# Patient Record
Sex: FEMALE | Race: WHITE | NOT HISPANIC OR LATINO | URBAN - METROPOLITAN AREA
[De-identification: names, ages, dates, MRNs, and addresses within clinical notes are randomized per-mention and may not be internally consistent; named-entity substitution may affect disease eponyms.]

---

## 2017-03-28 ENCOUNTER — OUTPATIENT (OUTPATIENT)
Dept: OUTPATIENT SERVICES | Facility: HOSPITAL | Age: 52
LOS: 1 days | Discharge: HOME | End: 2017-03-28

## 2017-04-24 ENCOUNTER — OUTPATIENT (OUTPATIENT)
Dept: OUTPATIENT SERVICES | Facility: HOSPITAL | Age: 52
LOS: 1 days | Discharge: HOME | End: 2017-04-24

## 2017-06-27 DIAGNOSIS — G35 MULTIPLE SCLEROSIS: ICD-10-CM

## 2017-07-06 ENCOUNTER — OUTPATIENT (OUTPATIENT)
Dept: OUTPATIENT SERVICES | Facility: HOSPITAL | Age: 52
LOS: 1 days | Discharge: HOME | End: 2017-07-06

## 2017-07-06 DIAGNOSIS — G35 MULTIPLE SCLEROSIS: ICD-10-CM

## 2017-07-06 DIAGNOSIS — R53.81 OTHER MALAISE: ICD-10-CM

## 2017-07-06 DIAGNOSIS — E55.9 VITAMIN D DEFICIENCY, UNSPECIFIED: ICD-10-CM

## 2017-07-06 DIAGNOSIS — Z12.31 ENCOUNTER FOR SCREENING MAMMOGRAM FOR MALIGNANT NEOPLASM OF BREAST: ICD-10-CM

## 2017-07-10 DIAGNOSIS — M81.0 AGE-RELATED OSTEOPOROSIS WITHOUT CURRENT PATHOLOGICAL FRACTURE: ICD-10-CM

## 2017-07-10 DIAGNOSIS — Z09 ENCOUNTER FOR FOLLOW-UP EXAMINATION AFTER COMPLETED TREATMENT FOR CONDITIONS OTHER THAN MALIGNANT NEOPLASM: ICD-10-CM

## 2017-07-10 DIAGNOSIS — Z82.62 FAMILY HISTORY OF OSTEOPOROSIS: ICD-10-CM

## 2017-07-10 DIAGNOSIS — N95.1 MENOPAUSAL AND FEMALE CLIMACTERIC STATES: ICD-10-CM

## 2017-12-07 ENCOUNTER — APPOINTMENT (OUTPATIENT)
Dept: PLASTIC SURGERY | Facility: CLINIC | Age: 52
End: 2017-12-07
Payer: COMMERCIAL

## 2017-12-07 PROCEDURE — 11950 SUBQ NJX FILLING MATRL 1CC/<: CPT

## 2017-12-28 ENCOUNTER — APPOINTMENT (OUTPATIENT)
Dept: SURGERY | Facility: CLINIC | Age: 52
End: 2017-12-28
Payer: MEDICAID

## 2017-12-28 DIAGNOSIS — Z87.891 PERSONAL HISTORY OF NICOTINE DEPENDENCE: ICD-10-CM

## 2017-12-28 DIAGNOSIS — Z87.09 PERSONAL HISTORY OF OTHER DISEASES OF THE RESPIRATORY SYSTEM: ICD-10-CM

## 2017-12-28 DIAGNOSIS — F41.9 ANXIETY DISORDER, UNSPECIFIED: ICD-10-CM

## 2017-12-28 DIAGNOSIS — Z87.39 PERSONAL HISTORY OF OTHER DISEASES OF THE MUSCULOSKELETAL SYSTEM AND CONNECTIVE TISSUE: ICD-10-CM

## 2017-12-28 DIAGNOSIS — N63.20 UNSPECIFIED LUMP IN THE LEFT BREAST, UNSPECIFIED QUADRANT: ICD-10-CM

## 2017-12-28 DIAGNOSIS — Z78.9 OTHER SPECIFIED HEALTH STATUS: ICD-10-CM

## 2017-12-28 PROCEDURE — 99202 OFFICE O/P NEW SF 15 MIN: CPT

## 2017-12-28 RX ORDER — ARMODAFINIL 150 MG/1
150 TABLET ORAL
Refills: 0 | Status: ACTIVE | COMMUNITY

## 2017-12-28 RX ORDER — SIMVASTATIN 20 MG/1
20 TABLET, FILM COATED ORAL
Refills: 0 | Status: ACTIVE | COMMUNITY

## 2017-12-28 RX ORDER — GLATIRAMER ACETATE 40 MG/ML
40 INJECTION, SOLUTION SUBCUTANEOUS
Refills: 0 | Status: ACTIVE | COMMUNITY

## 2017-12-28 RX ORDER — FLUOXETINE HYDROCHLORIDE 20 MG/1
20 CAPSULE ORAL
Refills: 0 | Status: ACTIVE | COMMUNITY

## 2018-01-25 ENCOUNTER — OUTPATIENT (OUTPATIENT)
Dept: OUTPATIENT SERVICES | Facility: HOSPITAL | Age: 53
LOS: 1 days | Discharge: HOME | End: 2018-01-25

## 2018-01-25 DIAGNOSIS — G35 MULTIPLE SCLEROSIS: ICD-10-CM

## 2018-02-15 ENCOUNTER — INPATIENT (INPATIENT)
Facility: HOSPITAL | Age: 53
LOS: 1 days | Discharge: HOME | End: 2018-02-17
Attending: INTERNAL MEDICINE | Admitting: INTERNAL MEDICINE

## 2018-02-15 VITALS
HEART RATE: 80 BPM | RESPIRATION RATE: 18 BRPM | DIASTOLIC BLOOD PRESSURE: 51 MMHG | TEMPERATURE: 97 F | SYSTOLIC BLOOD PRESSURE: 104 MMHG | OXYGEN SATURATION: 100 %

## 2018-02-15 DIAGNOSIS — F32.9 MAJOR DEPRESSIVE DISORDER, SINGLE EPISODE, UNSPECIFIED: ICD-10-CM

## 2018-02-15 DIAGNOSIS — G35 MULTIPLE SCLEROSIS: ICD-10-CM

## 2018-02-15 LAB
ALBUMIN SERPL ELPH-MCNC: 4.1 G/DL — SIGNIFICANT CHANGE UP (ref 3–5.5)
ALP SERPL-CCNC: 42 U/L — SIGNIFICANT CHANGE UP (ref 30–115)
ALT FLD-CCNC: 22 U/L — SIGNIFICANT CHANGE UP (ref 0–41)
ANION GAP SERPL CALC-SCNC: 10 MMOL/L — SIGNIFICANT CHANGE UP (ref 7–14)
APTT BLD: 27.9 SEC — SIGNIFICANT CHANGE UP (ref 27–39.2)
AST SERPL-CCNC: 24 U/L — SIGNIFICANT CHANGE UP (ref 0–41)
BASOPHILS # BLD AUTO: 0.04 K/UL — SIGNIFICANT CHANGE UP (ref 0–0.2)
BASOPHILS NFR BLD AUTO: 0.6 % — SIGNIFICANT CHANGE UP (ref 0–1)
BILIRUB SERPL-MCNC: 0.4 MG/DL — SIGNIFICANT CHANGE UP (ref 0.2–1.2)
BUN SERPL-MCNC: 11 MG/DL — SIGNIFICANT CHANGE UP (ref 10–20)
CALCIUM SERPL-MCNC: 9.3 MG/DL — SIGNIFICANT CHANGE UP (ref 8.5–10.1)
CHLORIDE SERPL-SCNC: 104 MMOL/L — SIGNIFICANT CHANGE UP (ref 98–110)
CO2 SERPL-SCNC: 26 MMOL/L — SIGNIFICANT CHANGE UP (ref 17–32)
CREAT SERPL-MCNC: 0.7 MG/DL — SIGNIFICANT CHANGE UP (ref 0.7–1.5)
EOSINOPHIL # BLD AUTO: 0.07 K/UL — SIGNIFICANT CHANGE UP (ref 0–0.7)
EOSINOPHIL NFR BLD AUTO: 1 % — SIGNIFICANT CHANGE UP (ref 0–8)
GLUCOSE SERPL-MCNC: 90 MG/DL — SIGNIFICANT CHANGE UP (ref 70–110)
HCT VFR BLD CALC: 37.9 % — SIGNIFICANT CHANGE UP (ref 37–47)
HGB BLD-MCNC: 13.1 G/DL — LOW (ref 14–18)
IMM GRANULOCYTES NFR BLD AUTO: 0.3 % — SIGNIFICANT CHANGE UP (ref 0.1–0.3)
INR BLD: 1.05 RATIO — SIGNIFICANT CHANGE UP (ref 0.65–1.3)
LYMPHOCYTES # BLD AUTO: 2.18 K/UL — SIGNIFICANT CHANGE UP (ref 1.2–3.4)
LYMPHOCYTES # BLD AUTO: 31.8 % — SIGNIFICANT CHANGE UP (ref 20.5–51.1)
MCHC RBC-ENTMCNC: 31 PG — SIGNIFICANT CHANGE UP (ref 27–31)
MCHC RBC-ENTMCNC: 34.6 G/DL — SIGNIFICANT CHANGE UP (ref 32–37)
MCV RBC AUTO: 89.8 FL — SIGNIFICANT CHANGE UP (ref 81–91)
MONOCYTES # BLD AUTO: 0.31 K/UL — SIGNIFICANT CHANGE UP (ref 0.1–0.6)
MONOCYTES NFR BLD AUTO: 4.5 % — SIGNIFICANT CHANGE UP (ref 1.7–9.3)
NEUTROPHILS # BLD AUTO: 4.23 K/UL — SIGNIFICANT CHANGE UP (ref 1.4–6.5)
NEUTROPHILS NFR BLD AUTO: 61.8 % — SIGNIFICANT CHANGE UP (ref 42.2–75.2)
NRBC # BLD: 0 /100 WBCS — SIGNIFICANT CHANGE UP (ref 0–0)
PLATELET # BLD AUTO: 239 K/UL — SIGNIFICANT CHANGE UP (ref 130–400)
POTASSIUM SERPL-MCNC: 4.4 MMOL/L — SIGNIFICANT CHANGE UP (ref 3.5–5)
POTASSIUM SERPL-SCNC: 4.4 MMOL/L — SIGNIFICANT CHANGE UP (ref 3.5–5)
PROT SERPL-MCNC: 6.6 G/DL — SIGNIFICANT CHANGE UP (ref 6–8)
PROTHROM AB SERPL-ACNC: 11.4 SEC — SIGNIFICANT CHANGE UP (ref 9.95–12.87)
RBC # BLD: 4.22 M/UL — SIGNIFICANT CHANGE UP (ref 4.2–5.4)
RBC # FLD: 13.1 % — SIGNIFICANT CHANGE UP (ref 11.5–14.5)
SODIUM SERPL-SCNC: 140 MMOL/L — SIGNIFICANT CHANGE UP (ref 135–146)
WBC # BLD: 6.85 K/UL — SIGNIFICANT CHANGE UP (ref 4.8–10.8)
WBC # FLD AUTO: 6.85 K/UL — SIGNIFICANT CHANGE UP (ref 4.8–10.8)

## 2018-02-15 RX ORDER — GLATIRAMER ACETATE 20 MG/ML
0 INJECTION, SOLUTION SUBCUTANEOUS
Qty: 0 | Refills: 0 | COMMUNITY

## 2018-02-15 RX ORDER — PANTOPRAZOLE SODIUM 20 MG/1
40 TABLET, DELAYED RELEASE ORAL
Qty: 0 | Refills: 0 | Status: DISCONTINUED | OUTPATIENT
Start: 2018-02-15 | End: 2018-02-17

## 2018-02-15 RX ORDER — FLUOXETINE HCL 10 MG
20 CAPSULE ORAL DAILY
Qty: 0 | Refills: 0 | Status: DISCONTINUED | OUTPATIENT
Start: 2018-02-15 | End: 2018-02-17

## 2018-02-15 RX ORDER — GLATIRAMER ACETATE 20 MG/ML
40 INJECTION, SOLUTION SUBCUTANEOUS EVERY OTHER DAY
Qty: 0 | Refills: 0 | Status: DISCONTINUED | OUTPATIENT
Start: 2018-02-15 | End: 2018-02-17

## 2018-02-15 RX ORDER — ENOXAPARIN SODIUM 100 MG/ML
40 INJECTION SUBCUTANEOUS EVERY 24 HOURS
Qty: 0 | Refills: 0 | Status: DISCONTINUED | OUTPATIENT
Start: 2018-02-15 | End: 2018-02-17

## 2018-02-15 RX ORDER — FLUOXETINE HCL 10 MG
0 CAPSULE ORAL
Qty: 0 | Refills: 0 | COMMUNITY

## 2018-02-15 RX ADMIN — Medication 58 MILLIGRAM(S): at 17:12

## 2018-02-15 NOTE — H&P ADULT - HISTORY OF PRESENT ILLNESS
53 y f with pmh of depression, ms which her last exacerbation in 2017 and she received iv steroids and ivig at home in may. pt states she started noticing weakness and pain in b/l foot worse on right since past 2 weeks which has been worsening and affected her gait. she states since yesterday her gait has been wobbly and also mentions she noticed her vision has became more blurry in both eyes and denies any double vision or peripheral vision loss. she also noticed numbness in her rt hand for past week. she denies any fever,n/v , chills, cp ,sob, cough. she was sent to by  today.  Tried to reach  for further workup or plan but left message

## 2018-02-15 NOTE — H&P ADULT - ASSESSMENT
53 y f with pmh of depression and ms presented with weakness adn pain in b/l foot worse in right, numbness in rt hand likely 2/2 ms exacerbation. 53 y f with pmh of depression and ms presented with weakness and pain in b/l foot worse in right, numbness in rt hand likely 2/2 ms exacerbation.

## 2018-02-15 NOTE — ED PROVIDER NOTE - NS ED ROS FT
Review of Systems:  	•	CONSTITUTIONAL - no fever, no diaphoresis, no weight change  	•	SKIN - no rash  	•	HEMATOLOGIC - no bleeding, no bruising  	•	EYES - no eye pain, chronic blurry vision  	•	ENT - no change in hearing, no pain  	•	RESPIRATORY - no shortness of breath, no cough  	•	CARDIAC - no chest pain, no palpitations  	•	GI - no abd pain, no nausea, no vomiting, no diarrhea, no constipation, no bleeding  	•	ENDO - no polydypsia, no polyurea, no heat/no cold intolerance  	•	MUSCULOSKELETAL - b/l leg weakness  	•	NEUROLOGIC -  b/l leg weakness, no headache, b/l foot and right hand paresthesias

## 2018-02-15 NOTE — ED PROVIDER NOTE - PHYSICAL EXAMINATION
VITAL SIGNS: I have reviewed nursing notes and confirm.  CONSTITUTIONAL: Well-developed; well-nourished; in no acute distress.  SKIN: Skin exam is warm and dry, no acute rash.  HEAD: Normocephalic; atraumatic.  EYES: PERRL, EOM intact; conjunctiva and sclera clear.  ENT: No nasal discharge; airway clear. TMs clear.  NECK: Supple; non tender.  CARD: S1, S2 normal; no murmurs, gallops, or rubs. Regular rate and rhythm.  RESP: No wheezes, rales or rhonchi.  ABD: Normal bowel sounds; soft; non-distended; non-tender; no hepatosplenomegaly.  EXT: Normal ROM. No clubbing, cyanosis or edema.  NEURO: Alert, oriented. Grossly unremarkable. No focal deficits. decreased sensation b/l feet and right hand  PSYCH: Cooperative, appropriate.

## 2018-02-15 NOTE — H&P ADULT - NSHPLABSRESULTS_GEN_ALL_CORE
T(C): 36.1 (02-15-18 @ 12:47), Max: 36.1 (02-15-18 @ 12:47)  HR: 80 (02-15-18 @ 12:47) (80 - 80)  BP: 104/51 (02-15-18 @ 12:47) (104/51 - 104/51)  RR: 18 (02-15-18 @ 12:47) (18 - 18)  SpO2: 100% (02-15-18 @ 12:47) (100% - 100%)                            13.1   6.85  )-----------( 239      ( 15 Feb 2018 15:40 )             37.9       02-15    140  |  104  |  11  ----------------------------<  90  4.4   |  26  |  0.7    Ca    9.3      15 Feb 2018 15:40    TPro  6.6  /  Alb  4.1  /  TBili  0.4  /  DBili  x   /  AST  24  /  ALT  22  /  AlkPhos  42  02-15                  PT/INR - ( 15 Feb 2018 15:40 )   PT: 11.40 sec;   INR: 1.05 ratio         PTT - ( 15 Feb 2018 15:40 )  PTT:27.9 sec

## 2018-02-15 NOTE — ED PROVIDER NOTE - OBJECTIVE STATEMENT
51 yo f with pmh of anxiety and MS, sent by dr. brown, presents with c/o increasing b/l leg weakness, b/l foot pain and numbness, and right hand numbness.  pt says her legs have been feeling "wobbly" and has collapsed in the past.  no head trauma.  no headache.  pt says has been worsening for about a week.  no f/c, n/v/d, abd pain, cp, sob.  pt admits has chronic b/l blurry vision, but worsened also.  reports that she has a severe flare about once a year requiring ivig and iv solumedrol, but had been able to stay home and have vns with infusion in the past.

## 2018-02-15 NOTE — H&P ADULT - PROBLEM SELECTOR PLAN 1
c/w solumedrol iv for 5 days  consider mri brain and spinal cord after discussion with neurology, pt request sedation before mri  consider ivig as pt benefited from ivig during last exacerbation after discussion with  neuro  start ppi  neurology c/s c/w solumedrol iv for 2 days  start ppi  neurology c/s

## 2018-02-15 NOTE — H&P ADULT - NSHPPHYSICALEXAM_GEN_ALL_CORE
PHYSICAL EXAM:      Constitutional: well developed and well nourished    Respiratory: lungs B/L clear on auscultation    Cardiovascular: S1S2 normal, no murmur heard    Gastrointestinal: Abd soft, non distended, non tender, BS+    Extremities: No pedal edema    Neurological: AAOX3, sensory- dec sensation to touch in rt foot, motor 5/5, cn 2-12 intact, rhomberg positive

## 2018-02-15 NOTE — H&P ADULT - ATTENDING COMMENTS
53 y f with pmh of depression, ms which her last exacerbation in 2017 and she received iv steroids and ivig at home in may. pt states she started noticing weakness and pain in b/l foot worse on right since past 2 weeks which has been worsening and affected her gait. she states 1-2d her gait has been wobbly and also mentions she noticed her vision has became more blurry in both eyes and denies any double vision or peripheral vision loss. she also noticed numbness in her rt hand for past week    #MS exacerbation  -IV Solumedrol  -PPI  -rehab  -check U/A, UCx    #Depression  -home meds    #GI, DVT prophylaxis

## 2018-02-15 NOTE — ED PROVIDER NOTE - PROGRESS NOTE DETAILS
endorsed to mar.  still no call back from neuro.  advised medicine team to f/u with neuro consult re: ivig

## 2018-02-16 ENCOUNTER — TRANSCRIPTION ENCOUNTER (OUTPATIENT)
Age: 53
End: 2018-02-16

## 2018-02-16 LAB
ANION GAP SERPL CALC-SCNC: 9 MMOL/L — SIGNIFICANT CHANGE UP (ref 7–14)
APPEARANCE UR: CLEAR — SIGNIFICANT CHANGE UP
BACTERIA # UR AUTO: (no result) /HPF
BILIRUB UR-MCNC: NEGATIVE — SIGNIFICANT CHANGE UP
BUN SERPL-MCNC: 11 MG/DL — SIGNIFICANT CHANGE UP (ref 10–20)
CALCIUM SERPL-MCNC: 9.1 MG/DL — SIGNIFICANT CHANGE UP (ref 8.5–10.1)
CHLORIDE SERPL-SCNC: 104 MMOL/L — SIGNIFICANT CHANGE UP (ref 98–110)
CO2 SERPL-SCNC: 25 MMOL/L — SIGNIFICANT CHANGE UP (ref 17–32)
COLOR SPEC: YELLOW — SIGNIFICANT CHANGE UP
CREAT SERPL-MCNC: 0.6 MG/DL — LOW (ref 0.7–1.5)
DIFF PNL FLD: NEGATIVE — SIGNIFICANT CHANGE UP
EPI CELLS # UR: (no result) /HPF
GLUCOSE SERPL-MCNC: 258 MG/DL — HIGH (ref 70–110)
GLUCOSE UR QL: NEGATIVE MG/DL — SIGNIFICANT CHANGE UP
HCT VFR BLD CALC: 37.4 % — SIGNIFICANT CHANGE UP (ref 37–47)
HGB BLD-MCNC: 13.2 G/DL — LOW (ref 14–18)
KETONES UR-MCNC: NEGATIVE — SIGNIFICANT CHANGE UP
LEUKOCYTE ESTERASE UR-ACNC: (no result)
MCHC RBC-ENTMCNC: 31.1 PG — HIGH (ref 27–31)
MCHC RBC-ENTMCNC: 35.3 G/DL — SIGNIFICANT CHANGE UP (ref 32–37)
MCV RBC AUTO: 88.2 FL — SIGNIFICANT CHANGE UP (ref 81–91)
NITRITE UR-MCNC: NEGATIVE — SIGNIFICANT CHANGE UP
NRBC # BLD: 0 /100 WBCS — SIGNIFICANT CHANGE UP (ref 0–0)
PH UR: 6.5 — SIGNIFICANT CHANGE UP (ref 5–8)
PLATELET # BLD AUTO: 236 K/UL — SIGNIFICANT CHANGE UP (ref 130–400)
POTASSIUM SERPL-MCNC: 3.8 MMOL/L — SIGNIFICANT CHANGE UP (ref 3.5–5)
POTASSIUM SERPL-SCNC: 3.8 MMOL/L — SIGNIFICANT CHANGE UP (ref 3.5–5)
PROT UR-MCNC: NEGATIVE MG/DL — SIGNIFICANT CHANGE UP
RBC # BLD: 4.24 M/UL — SIGNIFICANT CHANGE UP (ref 4.2–5.4)
RBC # FLD: 12.8 % — SIGNIFICANT CHANGE UP (ref 11.5–14.5)
SODIUM SERPL-SCNC: 138 MMOL/L — SIGNIFICANT CHANGE UP (ref 135–146)
SP GR SPEC: <=1.005 — SIGNIFICANT CHANGE UP (ref 1.01–1.03)
UROBILINOGEN FLD QL: 0.2 MG/DL — SIGNIFICANT CHANGE UP (ref 0.2–0.2)
WBC # BLD: 7.79 K/UL — SIGNIFICANT CHANGE UP (ref 4.8–10.8)
WBC # FLD AUTO: 7.79 K/UL — SIGNIFICANT CHANGE UP (ref 4.8–10.8)
WBC UR QL: (no result) /HPF

## 2018-02-16 RX ORDER — DIAZEPAM 5 MG
5 TABLET ORAL ONCE
Qty: 0 | Refills: 0 | Status: DISCONTINUED | OUTPATIENT
Start: 2018-02-16 | End: 2018-02-16

## 2018-02-16 RX ORDER — ACETAMINOPHEN 500 MG
650 TABLET ORAL ONCE
Qty: 0 | Refills: 0 | Status: COMPLETED | OUTPATIENT
Start: 2018-02-16 | End: 2018-02-16

## 2018-02-16 RX ORDER — DIAZEPAM 5 MG
5 TABLET ORAL THREE TIMES A DAY
Qty: 0 | Refills: 0 | Status: DISCONTINUED | OUTPATIENT
Start: 2018-02-16 | End: 2018-02-17

## 2018-02-16 RX ADMIN — GLATIRAMER ACETATE 40 MILLIGRAM(S): 20 INJECTION, SOLUTION SUBCUTANEOUS at 11:14

## 2018-02-16 RX ADMIN — Medication 650 MILLIGRAM(S): at 19:48

## 2018-02-16 RX ADMIN — Medication 5 MILLIGRAM(S): at 19:48

## 2018-02-16 RX ADMIN — Medication 19.33 MILLIGRAM(S): at 05:46

## 2018-02-16 RX ADMIN — ENOXAPARIN SODIUM 40 MILLIGRAM(S): 100 INJECTION SUBCUTANEOUS at 00:37

## 2018-02-16 RX ADMIN — Medication 20 MILLIGRAM(S): at 11:12

## 2018-02-16 RX ADMIN — PANTOPRAZOLE SODIUM 40 MILLIGRAM(S): 20 TABLET, DELAYED RELEASE ORAL at 05:47

## 2018-02-16 RX ADMIN — ENOXAPARIN SODIUM 40 MILLIGRAM(S): 100 INJECTION SUBCUTANEOUS at 23:52

## 2018-02-16 RX ADMIN — Medication 5 MILLIGRAM(S): at 15:55

## 2018-02-16 NOTE — PROGRESS NOTE ADULT - SUBJECTIVE AND OBJECTIVE BOX
Patient is a 52y old  Female who presents with a chief complaint of weakness (15 Feb 2018 20:48)      PAST MEDICAL & SURGICAL HISTORY:  MS (multiple sclerosis)  Depression, unspecified depression type  No significant past surgical history      MEDICATIONS  (STANDING):  enoxaparin Injectable 40 milliGRAM(s) SubCutaneous every 24 hours  FLUoxetine 20 milliGRAM(s) Oral daily  glatiramer Injectable 40 milliGRAM(s) SubCutaneous every other day  methylPREDNISolone sodium succinate IVPB 1000 milliGRAM(s) IV Intermittent daily  pantoprazole    Tablet 40 milliGRAM(s) Oral before breakfast    MEDICATIONS  (PRN):  diazepam    Tablet 5 milliGRAM(s) Oral three times a day PRN anxiety      Overnight events: no acute events    Vital Signs Last 24 Hrs  T(C): 35.8 (16 Feb 2018 15:57), Max: 36.5 (15 Feb 2018 21:00)  T(F): 96.4 (16 Feb 2018 15:57), Max: 97.7 (15 Feb 2018 21:00)  HR: 85 (16 Feb 2018 15:57) (62 - 85)  BP: 117/59 (16 Feb 2018 15:57) (100/64 - 117/59)  BP(mean): --  RR: 20 (16 Feb 2018 15:57) (16 - 20)  SpO2: 99% (16 Feb 2018 15:57) (99% - 99%)  CAPILLARY BLOOD GLUCOSE        I&O's Summary      Physical Exam:    -     General : lying comfortably on bed    -      HEENT: PERLLA    -      Cardiac: RRR    -      Pulm: B/l clear    -      GI: soft non tender    -      Musculoskeletal: no edema    -      Neuro: AO x 3, power 4+ on Rt foot, rest power 5/5        Labs:                        13.2   7.79  )-----------( 236      ( 16 Feb 2018 09:59 )             37.4             02-16    138  |  104  |  11  ----------------------------<  258<H>  3.8   |  25  |  0.6<L>    Ca    9.1      16 Feb 2018 09:59    TPro  6.6  /  Alb  4.1  /  TBili  0.4  /  DBili  x   /  AST  24  /  ALT  22  /  AlkPhos  42  02-15    LIVER FUNCTIONS - ( 15 Feb 2018 15:40 )  Alb: 4.1 g/dL / Pro: 6.6 g/dL / ALK PHOS: 42 U/L / ALT: 22 U/L / AST: 24 U/L / GGT: x                 PT/INR - ( 15 Feb 2018 15:40 )   PT: 11.40 sec;   INR: 1.05 ratio         PTT - ( 15 Feb 2018 15:40 )  PTT:27.9 sec              Imaging:    ECG: Patient is a 52y old  Female who presents with a chief complaint of weakness (15 Feb 2018 20:48)      PAST MEDICAL & SURGICAL HISTORY:  MS (multiple sclerosis)  Depression, unspecified depression type  No significant past surgical history      MEDICATIONS  (STANDING):  enoxaparin Injectable 40 milliGRAM(s) SubCutaneous every 24 hours  FLUoxetine 20 milliGRAM(s) Oral daily  glatiramer Injectable 40 milliGRAM(s) SubCutaneous every other day  methylPREDNISolone sodium succinate IVPB 1000 milliGRAM(s) IV Intermittent daily  pantoprazole    Tablet 40 milliGRAM(s) Oral before breakfast    MEDICATIONS  (PRN):  diazepam    Tablet 5 milliGRAM(s) Oral three times a day PRN anxiety      Overnight events: no acute events, feels better, no weakness, eyes Sx, dysuria, paresthesias    ICU Vital Signs Last 24 Hrs  T(C): 36.4 (17 Feb 2018 07:40), Max: 36.4 (17 Feb 2018 07:40)  T(F): 97.5 (17 Feb 2018 07:40), Max: 97.5 (17 Feb 2018 07:40)  HR: 58 (17 Feb 2018 07:40) (58 - 85)  BP: 105/51 (17 Feb 2018 07:40) (105/51 - 117/59)  BP(mean): --  ABP: --  ABP(mean): --  RR: 19 (17 Feb 2018 07:40) (19 - 20)  SpO2: 99% (16 Feb 2018 15:57) (99% - 99%)          I&O's Summary      Physical Exam:    -     General : lying comfortably on bed    -      HEENT: PERLLA    -      Cardiac: RRR    -      Pulm: B/l clear    -      GI: soft non tender    -      Musculoskeletal: no edema    -      Neuro: AO x 3, power 5/5        Labs:                        13.2   7.79  )-----------( 236      ( 16 Feb 2018 09:59 )             37.4             02-16    138  |  104  |  11  ----------------------------<  258<H>  3.8   |  25  |  0.6<L>    Ca    9.1      16 Feb 2018 09:59    TPro  6.6  /  Alb  4.1  /  TBili  0.4  /  DBili  x   /  AST  24  /  ALT  22  /  AlkPhos  42  02-15    LIVER FUNCTIONS - ( 15 Feb 2018 15:40 )  Alb: 4.1 g/dL / Pro: 6.6 g/dL / ALK PHOS: 42 U/L / ALT: 22 U/L / AST: 24 U/L / GGT: x                 PT/INR - ( 15 Feb 2018 15:40 )   PT: 11.40 sec;   INR: 1.05 ratio         PTT - ( 15 Feb 2018 15:40 )  PTT:27.9 sec              Imaging:    ECG:

## 2018-02-16 NOTE — DISCHARGE NOTE ADULT - MEDICATION SUMMARY - MEDICATIONS TO TAKE
I will START or STAY ON the medications listed below when I get home from the hospital:    FLUoxetine 20 mg oral capsule  -- 1 cap(s) by mouth once a day  -- Indication: For Depression, unspecified depression type    Copaxone 40 mg/mL subcutaneous solution  -- 40 milligram(s) subcutaneous 3 times a week  -- Indication: For MS (multiple sclerosis)

## 2018-02-16 NOTE — CONSULT NOTE ADULT - SUBJECTIVE AND OBJECTIVE BOX
HPI:  53 y f with pmh of depression, ms which her last exacerbation in 2017 and she received iv steroids and ivig at home in may. pt states she started noticing weakness and pain in b/l foot worse on right since past 2 weeks which has been worsening and affected her gait. she states since yesterday her gait has been wobbly and also mentions she noticed her vision has became more blurry in both eyes and denies any double vision or peripheral vision loss. she also noticed numbness in her rt hand for past week. she denies any fever,n/v , chills, cp ,sob, cough. she was sent to by  today.  Tried to reach  for further workup or plan but left message (15 Feb 2018 20:48)  for three days iv steroids the dc to home    T(C): 35.7 (02-16-18 @ 07:45), Max: 36.5 (02-15-18 @ 21:00)  HR: 62 (02-16-18 @ 07:45) (62 - 80)  BP: 112/53 (02-16-18 @ 07:45) (100/64 - 117/58)  RR: 16 (02-16-18 @ 07:45) (16 - 18)  SpO2: 100% (02-15-18 @ 12:47) (100% - 100%)    MOTOR EXAM 5/5 all ext except lle 4+5 distally   TRANSFERS AND AMBULATES INDEPENDENTLY WITHOUT DEVICE    Physical Medicine And Rehabilitation Services are not indicated in this patient for the following Reason(s):    [    ] Patient is medically unstable      [    ]  Patient does not have appropriate activity orders      [     ] Patient has no weight bearing status for:      [x     ] Patient is independently ambulating      [     ]  Patient is from Skilled Nursing Facility and is appropriate to return      [     ] Patient was non-ambulatory prior to admission      [     ]  Other  NO NEED FOR ACUTE CARE PT/REHAB  CAN DC HOME WITH FAMILY AND RESUME OPD PT AS PTA

## 2018-02-16 NOTE — CONSULT NOTE ADULT - ASSESSMENT
A 53 yo woman with h/o RRMS, depression adm for MS exacerbation. Now improving on IV Solumedrol.  - RRMS exacerbation  - h/o depression      Plan:  - cont Solumedrol 1gm IVPB q24hrs for 3 days total  - Cont Prozac at current dose  - cont Copaxone 40mg SC 3x a week  - GI, DVT proph  - PT/Rehab evaluation  - pt is neurologically stable for d/c after the 3rd dose of IV Solumedrol if OK with rehab  - will follow

## 2018-02-16 NOTE — DISCHARGE NOTE ADULT - PLAN OF CARE
Prevent recurrence Take medication as prescribed, follow up with your neurologist Take medication as prescribed, follow up with your neurologist, PMD and outpt PT

## 2018-02-16 NOTE — DISCHARGE NOTE ADULT - PATIENT PORTAL LINK FT
You can access the [facility], offered by [facility], by registering with the following website: [email or web address]

## 2018-02-16 NOTE — DISCHARGE NOTE ADULT - CARE PROVIDER_API CALL
Kush Mccollum), Internal Medicine  [address] [place] [id number]  Phone: [phone]  Fax: [phone]    Shiv Wagner), Neurology  [address] [place] [address]  Phone: [phone]  Fax: [phone]

## 2018-02-16 NOTE — DISCHARGE NOTE ADULT - CARE PLAN
Principal Discharge DX:	Multiple sclerosis exacerbation  Goal:	Prevent recurrence  Assessment and plan of treatment:	Take medication as prescribed, follow up with your neurologist Principal Discharge DX:	Multiple sclerosis exacerbation  Goal:	Prevent recurrence  Assessment and plan of treatment:	Take medication as prescribed, follow up with your neurologist, PMD and outpt PT

## 2018-02-16 NOTE — DISCHARGE NOTE ADULT - HOSPITAL COURSE
Presented with b/l lower weakness, steady gait and blurry vision, treated with Methylprednisone 1 gm x 3 days, feels better today, able to walk around.

## 2018-02-16 NOTE — PROGRESS NOTE ADULT - ASSESSMENT
# MS exacerbation:    Methylprednisone for total of 3 dose, last dose 2/17  c/w copaxone home dose  PT rehab: patient walking independently, no further work up needed  can be d/dave after 3rd dose    # Depression:   c/w prozac    # GI/DVT ppx    # Dispo:   can be d/dave home after 3rd dose of methyl prednisone # MS exacerbation:    Methylprednisone for total of 3 dose, last dose 2/17  c/w copaxone home dose  PT rehab: patient walking independently, no further work up needed can be d/dave after 3rd dose    # Depression:   c/w prozac    # GI/DVT ppx    # Dispo:   can be d/dave home after 3rd dose of methyl prednisone

## 2018-02-16 NOTE — CONSULT NOTE ADULT - SUBJECTIVE AND OBJECTIVE BOX
Neurology consult    SHALINI [id number]    HPI:  53 y f with pmh of depression, ms which her last exacerbation in 2017 and she received iv steroids and ivig at home in may. Pt states she started noticing weakness and pain in b/l foot worse on right, numbness in the rt hand, weakness in the lr arm, unsteadiness since past 2 weeks which has been worsening and affected her gait. She states since 2 days ago her gait has been wobbly and also mentions she noticed her vision has became more blurry in both eyes and denies any double vision or peripheral vision loss. She denies any fever, n/v , chills, cp ,sob, cough. She saw her neurologist dr. Wagner yesterday in the office and was sent to ER for MS exacerbation.  Was recommended to start on IV steroids for 3 days.  Chart reviewed. Was started ON Solumedrol IV 1gm yesterday, today day# 2/3. Symptoms are improving.        MEDICATIONS    enoxaparin Injectable 40 milliGRAM(s) SubCutaneous every 24 hours  FLUoxetine 20 milliGRAM(s) Oral daily  glatiramer Injectable 40 milliGRAM(s) SubCutaneous every other day  methylPREDNISolone sodium succinate IVPB 1000 milliGRAM(s) IV Intermittent daily  pantoprazole    Tablet 40 milliGRAM(s) Oral before breakfast      PAST MEDICAL & SURGICAL HISTORY:  MS (multiple sclerosis)  Depression, unspecified depression type  No significant past surgical history      Family history: No history of dementia, strokes, or seizures   FAMILY HISTORY:  Family history of MS (multiple sclerosis) (Sibling): sister    SOCIAL HISTORY --    Denies smoking, ETOH, drugs    Allergies  No Known Allergies    Intolerances        Height (cm): 175.26 (02-15 @ 21:00)  Weight (kg): 69.4 (02-15 @ 21:00)  BMI (kg/m2): 22.6 (02-15 @ 21:00)    Vital Signs Last 24 Hrs  T(C): 35.7 (16 Feb 2018 07:45), Max: 36.5 (15 Feb 2018 21:00)  T(F): 96.3 (16 Feb 2018 07:45), Max: 97.7 (15 Feb 2018 21:00)  HR: 62 (16 Feb 2018 07:45) (62 - 80)  BP: 112/53 (16 Feb 2018 07:45) (100/64 - 117/58)  BP(mean): --  RR: 16 (16 Feb 2018 07:45) (16 - 18)  SpO2: 100% (15 Feb 2018 12:47) (100% - 100%)    MULTIPLE SCLEROSIS  ^MULTIPLE SCLEROSIS  No h/o HF  Family history of MS (multiple sclerosis) (Sibling)  Handoff  MEWS Score  MS (multiple sclerosis)  Depression, unspecified depression type  Multiple sclerosis  Depression, unspecified depression type  Multiple sclerosis exacerbation  No significant past surgical history  HAS MX/SEVERE ANXIETY      REVIEW OF SYSTEMS:    Constitutional: No fever, chills, fatigue, weakness  Eyes: no eye pain, visual disturbances, or discharge  ENT:  No difficulty hearing, tinnitus, vertigo; No sinus or throat pain  Neck: No pain or stiffness  Respiratory: No cough, dyspnea, wheezing   Cardiovascular: No chest pain, palpitations,   Gastrointestinal: No abdominal or epigastric pain. No nausea, vomiting  No diarrhea or constipation.   Genitourinary: No dysuria, frequency, hematuria or incontinence  Neurological: No headaches, lightheadedness, vertigo, numbness or tremors  Psychiatric: No depression, anxiety, mood swings or difficulty sleeping  Musculoskeletal: No joint pain or swelling; No muscle, back or extremity pain  Skin: No itching, burning, rashes or lesions   Lymph Nodes: No enlarged glands  Endocrine: No heat or cold intolerance; No hair loss, No h/o diabetes or thyroid dysfunction  Allergy and Immunologic: No hives or eczema      PHYSICAL EXAMINATION:  General: Well-developed, well nourished, in no acute distress.  Eyes: Conjunctiva and sclera clear.  Cardiovascular: Regular rate and rhythm; S1 and S2 Normal; No murmurs, gallops or rubs.  Neurologic:  - Mental Status:  Alert, awake, oriented to person, place, and time; Speech is fluent with intact naming, repetition, and comprehension;  - Cranial Nerves II-XII:    II:  Visual acuity is 20/20 bilaterally; Visual fields are full to confronation;   III, IV, VI:  Extraocular movements are intact without nystagmus.  V:  Facial sensation is intact in the V1-V3 distribution bilaterally.  VII:  Face is symmetric  VIII:  Hearing is intact to finger rub.  IX, X:  Uvula is midline and soft palate rises symmetrically  XI:  Head turning and shoulder shrug are intact.  XII:  Tongue protudes in the midline.  - Motor:  Strength is 5/5 in RUE, karoline LEs, LUE - 4/5.  There is no prontator drift.  Normal muscle bulk and tone throughout.  - Reflexes:  3+ and symmetric at the biceps, triceps, brachioradialis, knees, and ankles.  Plantar responses flexor bl.  - Sensory:  Intact to light touch, pin prick sense throughout.  - Coordination:  Finger-nose-finger and heel-knee-shin intact without dysmetria.  Rapid alternating hand movements intact.  - Gait:   Normal steps, base, arm swing, and turning.  Heel and toe walking are normal.  Tandem gait is normal.  Romberg testing is negative.              LABS:  CBC Full  -  ( 15 Feb 2018 15:40 )  WBC Count : 6.85 K/uL  Hemoglobin : 13.1 g/dL  Hematocrit : 37.9 %  Platelet Count - Automated : 239 K/uL  Mean Cell Volume : 89.8 fL  Mean Cell Hemoglobin : 31.0 pg  Mean Cell Hemoglobin Concentration : 34.6 g/dL  Auto Neutrophil # : 4.23 K/uL  Auto Lymphocyte # : 2.18 K/uL  Auto Monocyte # : 0.31 K/uL  Auto Eosinophil # : 0.07 K/uL  Auto Basophil # : 0.04 K/uL  Auto Neutrophil % : 61.8 %  Auto Lymphocyte % : 31.8 %  Auto Monocyte % : 4.5 %  Auto Eosinophil % : 1.0 %  Auto Basophil % : 0.6 %      02-15    140  |  104  |  11  ----------------------------<  90  4.4   |  26  |  0.7    Ca    9.3      15 Feb 2018 15:40    TPro  6.6  /  Alb  4.1  /  TBili  0.4  /  DBili  x   /  AST  24  /  ALT  22  /  AlkPhos  42  02-15    Hemoglobin A1C:     LIVER FUNCTIONS - ( 15 Feb 2018 15:40 )  Alb: 4.1 g/dL / Pro: 6.6 g/dL / ALK PHOS: 42 U/L / ALT: 22 U/L / AST: 24 U/L / GGT: x           Vitamin B12   PT/INR - ( 15 Feb 2018 15:40 )   PT: 11.40 sec;   INR: 1.05 ratio         PTT - ( 15 Feb 2018 15:40 )  PTT:27.9 sec      RADIOLOGY    EKG

## 2018-02-17 VITALS
RESPIRATION RATE: 19 BRPM | SYSTOLIC BLOOD PRESSURE: 105 MMHG | TEMPERATURE: 98 F | HEART RATE: 58 BPM | DIASTOLIC BLOOD PRESSURE: 51 MMHG

## 2018-02-17 RX ADMIN — Medication 20 MILLIGRAM(S): at 11:29

## 2018-02-17 RX ADMIN — Medication 650 MILLIGRAM(S): at 06:06

## 2018-02-17 RX ADMIN — Medication 5 MILLIGRAM(S): at 11:33

## 2018-02-17 RX ADMIN — PANTOPRAZOLE SODIUM 40 MILLIGRAM(S): 20 TABLET, DELAYED RELEASE ORAL at 06:05

## 2018-02-17 RX ADMIN — Medication 19.33 MILLIGRAM(S): at 06:04

## 2018-02-21 DIAGNOSIS — G35 MULTIPLE SCLEROSIS: ICD-10-CM

## 2018-02-21 DIAGNOSIS — F41.9 ANXIETY DISORDER, UNSPECIFIED: ICD-10-CM

## 2018-02-21 DIAGNOSIS — Z82.0 FAMILY HISTORY OF EPILEPSY AND OTHER DISEASES OF THE NERVOUS SYSTEM: ICD-10-CM

## 2018-02-21 DIAGNOSIS — H53.8 OTHER VISUAL DISTURBANCES: ICD-10-CM

## 2018-02-21 DIAGNOSIS — R53.1 WEAKNESS: ICD-10-CM

## 2018-02-21 DIAGNOSIS — F32.9 MAJOR DEPRESSIVE DISORDER, SINGLE EPISODE, UNSPECIFIED: ICD-10-CM

## 2018-02-28 ENCOUNTER — APPOINTMENT (OUTPATIENT)
Dept: OBGYN | Facility: CLINIC | Age: 53
End: 2018-02-28

## 2018-03-22 ENCOUNTER — APPOINTMENT (OUTPATIENT)
Dept: OBGYN | Facility: CLINIC | Age: 53
End: 2018-03-22
Payer: MEDICAID

## 2018-03-22 PROCEDURE — 99212 OFFICE O/P EST SF 10 MIN: CPT | Mod: 25

## 2018-03-22 PROCEDURE — 96372 THER/PROPH/DIAG INJ SC/IM: CPT

## 2018-04-18 ENCOUNTER — APPOINTMENT (OUTPATIENT)
Dept: PLASTIC SURGERY | Facility: CLINIC | Age: 53
End: 2018-04-18

## 2018-07-23 PROBLEM — Z78.9 ALCOHOL USE: Status: ACTIVE | Noted: 2017-12-28

## 2018-09-20 ENCOUNTER — APPOINTMENT (OUTPATIENT)
Dept: OBGYN | Facility: CLINIC | Age: 53
End: 2018-09-20
Payer: MEDICAID

## 2018-09-20 PROCEDURE — 96372 THER/PROPH/DIAG INJ SC/IM: CPT

## 2018-09-20 PROCEDURE — 99212 OFFICE O/P EST SF 10 MIN: CPT | Mod: 25

## 2018-10-15 ENCOUNTER — OUTPATIENT (OUTPATIENT)
Dept: OUTPATIENT SERVICES | Facility: HOSPITAL | Age: 53
LOS: 1 days | Discharge: HOME | End: 2018-10-15

## 2018-10-15 DIAGNOSIS — Z12.31 ENCOUNTER FOR SCREENING MAMMOGRAM FOR MALIGNANT NEOPLASM OF BREAST: ICD-10-CM

## 2019-07-01 ENCOUNTER — APPOINTMENT (OUTPATIENT)
Dept: PLASTIC SURGERY | Facility: CLINIC | Age: 54
End: 2019-07-01

## 2019-08-12 ENCOUNTER — APPOINTMENT (OUTPATIENT)
Dept: PLASTIC SURGERY | Facility: CLINIC | Age: 54
End: 2019-08-12

## 2019-09-03 ENCOUNTER — APPOINTMENT (OUTPATIENT)
Dept: PLASTIC SURGERY | Facility: CLINIC | Age: 54
End: 2019-09-03

## 2019-09-23 ENCOUNTER — APPOINTMENT (OUTPATIENT)
Dept: OBGYN | Facility: CLINIC | Age: 54
End: 2019-09-23
Payer: MEDICAID

## 2019-09-23 VITALS
DIASTOLIC BLOOD PRESSURE: 74 MMHG | HEIGHT: 69 IN | BODY MASS INDEX: 24.14 KG/M2 | SYSTOLIC BLOOD PRESSURE: 118 MMHG | WEIGHT: 163 LBS

## 2019-09-23 DIAGNOSIS — Z01.411 ENCOUNTER FOR GYNECOLOGICAL EXAMINATION (GENERAL) (ROUTINE) WITH ABNORMAL FINDINGS: ICD-10-CM

## 2019-09-23 DIAGNOSIS — N81.4 UTEROVAGINAL PROLAPSE, UNSPECIFIED: ICD-10-CM

## 2019-09-23 DIAGNOSIS — N95.2 POSTMENOPAUSAL ATROPHIC VAGINITIS: ICD-10-CM

## 2019-09-23 PROCEDURE — 99396 PREV VISIT EST AGE 40-64: CPT

## 2019-09-23 PROCEDURE — 99213 OFFICE O/P EST LOW 20 MIN: CPT | Mod: 25

## 2019-09-23 NOTE — PHYSICAL EXAM
[Awake] : awake [Alert] : alert [Acute Distress] : no acute distress [Mass] : no breast mass [Nipple Discharge] : no nipple discharge [Axillary LAD] : no axillary lymphadenopathy [Soft] : soft [Oriented x3] : oriented to person, place, and time [Tender] : non tender [Normal] : uterus [Atrophy] : atrophy [No Bleeding] : there was no active vaginal bleeding [Cystocele] : a cystocele [Uterine Adnexae] : were not tender and not enlarged

## 2019-10-02 ENCOUNTER — APPOINTMENT (OUTPATIENT)
Dept: PLASTIC SURGERY | Facility: CLINIC | Age: 54
End: 2019-10-02

## 2019-10-22 ENCOUNTER — OUTPATIENT (OUTPATIENT)
Dept: OUTPATIENT SERVICES | Facility: HOSPITAL | Age: 54
LOS: 1 days | Discharge: HOME | End: 2019-10-22
Payer: MEDICAID

## 2019-10-22 DIAGNOSIS — Z12.31 ENCOUNTER FOR SCREENING MAMMOGRAM FOR MALIGNANT NEOPLASM OF BREAST: ICD-10-CM

## 2019-10-22 PROCEDURE — 77063 BREAST TOMOSYNTHESIS BI: CPT | Mod: 26

## 2019-10-22 PROCEDURE — 77067 SCR MAMMO BI INCL CAD: CPT | Mod: 26

## 2019-10-28 ENCOUNTER — APPOINTMENT (OUTPATIENT)
Dept: PLASTIC SURGERY | Facility: CLINIC | Age: 54
End: 2019-10-28

## 2020-01-01 NOTE — H&P ADULT - NSHPREVIEWOFSYSTEMS_GEN_ALL_CORE
[place] affected by maternal pre-eclampsia REVIEW OF SYSTEMS:    CONSTITUTIONAL:+ weakness,no  fevers or chills  EYES/ENT: blurry vision both eyes ;  No vertigo or throat pain   NECK: No pain or stiffness  RESPIRATORY: no sob, cough  CARDIOVASCULAR: No chest pain or palpitations  GASTROINTESTINAL: No abdominal or epigastric pain. No nausea, vomiting, or hematemesis; No diarrhea or constipation. No melena or hematochezia.  GENITOURINARY: No dysuria, frequency or hematuria  NEUROLOGICAL: see hpi  SKIN: No itching, rashes

## 2020-10-19 ENCOUNTER — APPOINTMENT (OUTPATIENT)
Dept: OBGYN | Facility: CLINIC | Age: 55
End: 2020-10-19
Payer: MEDICAID

## 2020-10-19 VITALS
BODY MASS INDEX: 25.1 KG/M2 | TEMPERATURE: 99 F | WEIGHT: 170 LBS | SYSTOLIC BLOOD PRESSURE: 120 MMHG | DIASTOLIC BLOOD PRESSURE: 78 MMHG

## 2020-10-19 PROCEDURE — 99396 PREV VISIT EST AGE 40-64: CPT

## 2020-10-19 NOTE — DISCUSSION/SUMMARY
[FreeTextEntry1] : Pap done\par Self breast exam stressed\par Prescribed bilateral screening mammogram\par Follow-up yearly or as needed

## 2020-10-19 NOTE — PHYSICAL EXAM
[Alert] : alert [Appropriately responsive] : appropriately responsive [No Acute Distress] : no acute distress [No Lymphadenopathy] : no lymphadenopathy [No Murmurs] : no murmurs [Regular Rate Rhythm] : regular rate rhythm [Soft] : soft [Clear to Auscultation B/L] : clear to auscultation bilaterally [Non-tender] : non-tender [Non-distended] : non-distended [No Mass] : no mass [No HSM] : No HSM [No Lesions] : no lesions [Examination Of The Breasts] : a normal appearance [Oriented x3] : oriented x3 [No Masses] : no breast masses were palpable [Labia Minora] : normal [Labia Majora] : normal [Uterine Adnexae] : normal [Normal] : normal

## 2020-10-19 NOTE — HISTORY OF PRESENT ILLNESS
[FreeTextEntry1] : Patient is 55 years old para 0-0-0-0 last menstrual period 2017\par She denies postmenopausal bleeding and is presently without complaints

## 2021-10-21 ENCOUNTER — APPOINTMENT (OUTPATIENT)
Dept: OBGYN | Facility: CLINIC | Age: 56
End: 2021-10-21

## 2021-11-22 ENCOUNTER — APPOINTMENT (OUTPATIENT)
Dept: OBGYN | Facility: CLINIC | Age: 56
End: 2021-11-22

## 2022-02-03 ENCOUNTER — APPOINTMENT (OUTPATIENT)
Dept: OBGYN | Facility: CLINIC | Age: 57
End: 2022-02-03

## 2022-02-24 ENCOUNTER — APPOINTMENT (OUTPATIENT)
Dept: OBGYN | Facility: CLINIC | Age: 57
End: 2022-02-24
Payer: MEDICAID

## 2022-02-24 VITALS — DIASTOLIC BLOOD PRESSURE: 76 MMHG | SYSTOLIC BLOOD PRESSURE: 118 MMHG

## 2022-02-24 DIAGNOSIS — Z01.419 ENCOUNTER FOR GYNECOLOGICAL EXAMINATION (GENERAL) (ROUTINE) W/OUT ABNORMAL FINDINGS: ICD-10-CM

## 2022-02-24 PROCEDURE — 99396 PREV VISIT EST AGE 40-64: CPT

## 2022-02-24 NOTE — HISTORY OF PRESENT ILLNESS
[FreeTextEntry1] : Patient is 56 years old para 0-0-0-0 last menstrual period 2017.\par She denies postmenopausal bleeding and is presently without complaints.

## 2022-03-26 ENCOUNTER — RESULT REVIEW (OUTPATIENT)
Age: 57
End: 2022-03-26

## 2022-03-26 ENCOUNTER — OUTPATIENT (OUTPATIENT)
Dept: OUTPATIENT SERVICES | Facility: HOSPITAL | Age: 57
LOS: 1 days | Discharge: HOME | End: 2022-03-26
Payer: COMMERCIAL

## 2022-03-26 DIAGNOSIS — Z12.31 ENCOUNTER FOR SCREENING MAMMOGRAM FOR MALIGNANT NEOPLASM OF BREAST: ICD-10-CM

## 2022-03-26 PROCEDURE — 77067 SCR MAMMO BI INCL CAD: CPT | Mod: 26

## 2022-03-26 PROCEDURE — 77063 BREAST TOMOSYNTHESIS BI: CPT | Mod: 26

## 2022-07-28 NOTE — H&P ADULT - CLICK TO LAUNCH ORM
.
You can access the FollowMyHealth Patient Portal offered by [facility] by registering at the following website: [email or web address] By joining [facility]’s FollowMyHealth portal, you will also be able to view your health information using other applications (apps) compatible with our system.

## 2022-09-02 ENCOUNTER — APPOINTMENT (OUTPATIENT)
Dept: ORTHOPEDIC SURGERY | Facility: CLINIC | Age: 57
End: 2022-09-02

## 2022-09-08 ENCOUNTER — APPOINTMENT (OUTPATIENT)
Dept: ORTHOPEDIC SURGERY | Facility: CLINIC | Age: 57
End: 2022-09-08

## 2022-09-08 VITALS — BODY MASS INDEX: 23.7 KG/M2 | HEIGHT: 69 IN | WEIGHT: 160 LBS

## 2022-09-08 DIAGNOSIS — M25.572 PAIN IN RIGHT ANKLE AND JOINTS OF RIGHT FOOT: ICD-10-CM

## 2022-09-08 DIAGNOSIS — M25.571 PAIN IN RIGHT ANKLE AND JOINTS OF RIGHT FOOT: ICD-10-CM

## 2022-09-08 DIAGNOSIS — G89.29 PAIN IN RIGHT ANKLE AND JOINTS OF RIGHT FOOT: ICD-10-CM

## 2022-09-08 DIAGNOSIS — G35 MULTIPLE SCLEROSIS: ICD-10-CM

## 2022-09-08 PROCEDURE — 20605 DRAIN/INJ JOINT/BURSA W/O US: CPT | Mod: 50

## 2022-09-08 PROCEDURE — 99203 OFFICE O/P NEW LOW 30 MIN: CPT | Mod: 25

## 2022-09-08 NOTE — HISTORY OF PRESENT ILLNESS
[de-identified] :  pleasant woman 57 history MS treated by Dr. Wagner\par  has seen Dr. Mayen in the past last visit 01/02/2018 and has been manage her ankle pain with an occasional cortisone injection also history of a left patella fracture

## 2022-09-08 NOTE — IMAGING
[de-identified] : Pleasant woman knees to examine hip and knee full motion bilaterall\par \par PHYSICAL EXAM: On exam, the patient is a well-appearing 52-year-old female in no acute distress. She is alert, awake, oriented x3, sitting comfortably on the exam table. She is well groomed. She is afebrile. She has a pleasant demeanor. She is accompanied by her .   both ankles: She has no edema. No ecchymosis. Her skin is intact. Palpable dorsalis pedis pulse. Sensation is intact to light touch throughout. She has 5/5 strength throughout. Symmetric range of motion. No laxity. No calf tenderness. She has no tenderness over the talar dome. \par

## 2022-09-08 NOTE — ASSESSMENT
[FreeTextEntry1] :  patient has chronic ankle symptoms referable to her MS managed occasionally by cortisone injections in the past by Dr. Mayen we discussed today repeating the injection which she was receptive to do in both ankles\par  the option of a  cortisone injection in  both ankles was discussed with the patient today.  risks and benefits were reviewed and  consent was given.  with sterile technique  through a  anterior approach 3 cc dexamethasone (12 mg) and 3cc 1%  lidocaine was infiltrated with good effect and a  Band-Aid applied ice was  recommended\par  will plan to do it in 6 months all questions were  answered she could take Tylenol Advil tonight\par

## 2023-03-09 ENCOUNTER — APPOINTMENT (OUTPATIENT)
Dept: ORTHOPEDIC SURGERY | Facility: CLINIC | Age: 58
End: 2023-03-09

## 2023-03-24 ENCOUNTER — APPOINTMENT (OUTPATIENT)
Dept: OBGYN | Facility: CLINIC | Age: 58
End: 2023-03-24

## 2023-03-27 ENCOUNTER — APPOINTMENT (OUTPATIENT)
Dept: ORTHOPEDIC SURGERY | Facility: CLINIC | Age: 58
End: 2023-03-27

## 2023-10-16 ENCOUNTER — APPOINTMENT (OUTPATIENT)
Dept: ORTHOPEDIC SURGERY | Facility: CLINIC | Age: 58
End: 2023-10-16

## 2023-10-23 ENCOUNTER — APPOINTMENT (OUTPATIENT)
Dept: ORTHOPEDIC SURGERY | Facility: CLINIC | Age: 58
End: 2023-10-23
Payer: MEDICAID

## 2023-10-23 VITALS — HEIGHT: 69 IN | BODY MASS INDEX: 23.7 KG/M2 | WEIGHT: 160 LBS

## 2023-10-23 PROCEDURE — 20605 DRAIN/INJ JOINT/BURSA W/O US: CPT | Mod: 50

## 2023-10-23 PROCEDURE — 99214 OFFICE O/P EST MOD 30 MIN: CPT | Mod: 25

## 2023-10-23 PROCEDURE — 73610 X-RAY EXAM OF ANKLE: CPT | Mod: 50

## 2023-11-16 ENCOUNTER — APPOINTMENT (OUTPATIENT)
Dept: NEUROLOGY | Facility: CLINIC | Age: 58
End: 2023-11-16

## 2024-02-01 ENCOUNTER — APPOINTMENT (OUTPATIENT)
Dept: OBGYN | Facility: CLINIC | Age: 59
End: 2024-02-01

## 2024-03-25 ENCOUNTER — APPOINTMENT (OUTPATIENT)
Dept: ORTHOPEDIC SURGERY | Facility: CLINIC | Age: 59
End: 2024-03-25
Payer: MEDICAID

## 2024-03-25 DIAGNOSIS — M65.9 SYNOVITIS AND TENOSYNOVITIS, UNSPECIFIED: ICD-10-CM

## 2024-03-25 PROCEDURE — 99213 OFFICE O/P EST LOW 20 MIN: CPT | Mod: 25

## 2024-03-25 PROCEDURE — 20605 DRAIN/INJ JOINT/BURSA W/O US: CPT | Mod: 50

## 2024-03-25 NOTE — HISTORY OF PRESENT ILLNESS
[de-identified] : Patient is here for follow-up of her bilateral ankles.  Last time I saw her the injection really did help but unfortunately has started where way over the past couple weeks.  She notices pain on the right side over the lateral ankle and on the left side over the medial ankle primarily.  Her pain is worsened with weightbearing and alleviated with rest.

## 2024-03-25 NOTE — DISCUSSION/SUMMARY
[de-identified] : Patient would like to proceed forward with another injection which I think is reasonable given the response she had last time and the amount of time it has been since her last injection.  Under sterile conditions I injected 2 cc of lidocaine and 1 cc of dexamethasone into each ankle joint.  On the right side I utilized an anterolateral portal with care to avoid the superficial peroneal nerve.  On the left side I utilized an anteromedial portal.  The patient tolerated the procedure well.  I counseled her on the possible aftereffects of an injection.  I will see her back on as-needed basis.  All questions answered

## 2024-03-25 NOTE — PHYSICAL EXAM
[de-identified] : She is tender throughout the tibiotalar joint.  She has full range of motion.  Pain throughout the entire arc of motion.  Compartments are soft compressible.  She is neurovascular intact distally.

## 2024-06-18 ENCOUNTER — INPATIENT (INPATIENT)
Facility: HOSPITAL | Age: 59
LOS: 0 days | Discharge: HOME | DRG: 43 | End: 2024-06-19
Attending: PSYCHIATRY & NEUROLOGY | Admitting: HOSPITALIST
Payer: MEDICAID

## 2024-06-18 VITALS
HEART RATE: 84 BPM | DIASTOLIC BLOOD PRESSURE: 73 MMHG | OXYGEN SATURATION: 100 % | SYSTOLIC BLOOD PRESSURE: 141 MMHG | WEIGHT: 134.92 LBS | TEMPERATURE: 97 F | RESPIRATION RATE: 19 BRPM

## 2024-06-18 DIAGNOSIS — G35 MULTIPLE SCLEROSIS: ICD-10-CM

## 2024-06-18 LAB
ALBUMIN SERPL ELPH-MCNC: 4.5 G/DL — SIGNIFICANT CHANGE UP (ref 3.5–5.2)
ALP SERPL-CCNC: 56 U/L — SIGNIFICANT CHANGE UP (ref 30–115)
ALT FLD-CCNC: 49 U/L — HIGH (ref 0–41)
ANION GAP SERPL CALC-SCNC: 13 MMOL/L — SIGNIFICANT CHANGE UP (ref 7–14)
AST SERPL-CCNC: 37 U/L — SIGNIFICANT CHANGE UP (ref 0–41)
BASOPHILS # BLD AUTO: 0.08 K/UL — SIGNIFICANT CHANGE UP (ref 0–0.2)
BASOPHILS NFR BLD AUTO: 1 % — SIGNIFICANT CHANGE UP (ref 0–1)
BILIRUB SERPL-MCNC: 0.3 MG/DL — SIGNIFICANT CHANGE UP (ref 0.2–1.2)
BUN SERPL-MCNC: 17 MG/DL — SIGNIFICANT CHANGE UP (ref 10–20)
CALCIUM SERPL-MCNC: 9.8 MG/DL — SIGNIFICANT CHANGE UP (ref 8.4–10.5)
CHLORIDE SERPL-SCNC: 102 MMOL/L — SIGNIFICANT CHANGE UP (ref 98–110)
CO2 SERPL-SCNC: 22 MMOL/L — SIGNIFICANT CHANGE UP (ref 17–32)
CREAT SERPL-MCNC: 0.9 MG/DL — SIGNIFICANT CHANGE UP (ref 0.7–1.5)
EGFR: 73 ML/MIN/1.73M2 — SIGNIFICANT CHANGE UP
EOSINOPHIL # BLD AUTO: 0.11 K/UL — SIGNIFICANT CHANGE UP (ref 0–0.7)
EOSINOPHIL NFR BLD AUTO: 1.3 % — SIGNIFICANT CHANGE UP (ref 0–8)
GLUCOSE BLDC GLUCOMTR-MCNC: 214 MG/DL — HIGH (ref 70–99)
GLUCOSE SERPL-MCNC: 86 MG/DL — SIGNIFICANT CHANGE UP (ref 70–99)
HCT VFR BLD CALC: 43.6 % — SIGNIFICANT CHANGE UP (ref 37–47)
HGB BLD-MCNC: 14.8 G/DL — SIGNIFICANT CHANGE UP (ref 12–16)
IMM GRANULOCYTES NFR BLD AUTO: 0.4 % — HIGH (ref 0.1–0.3)
LYMPHOCYTES # BLD AUTO: 2.22 K/UL — SIGNIFICANT CHANGE UP (ref 1.2–3.4)
LYMPHOCYTES # BLD AUTO: 26.9 % — SIGNIFICANT CHANGE UP (ref 20.5–51.1)
MCHC RBC-ENTMCNC: 31.6 PG — HIGH (ref 27–31)
MCHC RBC-ENTMCNC: 33.9 G/DL — SIGNIFICANT CHANGE UP (ref 32–37)
MCV RBC AUTO: 93 FL — SIGNIFICANT CHANGE UP (ref 81–99)
MONOCYTES # BLD AUTO: 0.62 K/UL — HIGH (ref 0.1–0.6)
MONOCYTES NFR BLD AUTO: 7.5 % — SIGNIFICANT CHANGE UP (ref 1.7–9.3)
NEUTROPHILS # BLD AUTO: 5.19 K/UL — SIGNIFICANT CHANGE UP (ref 1.4–6.5)
NEUTROPHILS NFR BLD AUTO: 62.9 % — SIGNIFICANT CHANGE UP (ref 42.2–75.2)
NRBC # BLD: 0 /100 WBCS — SIGNIFICANT CHANGE UP (ref 0–0)
PLATELET # BLD AUTO: 173 K/UL — SIGNIFICANT CHANGE UP (ref 130–400)
PMV BLD: 10.8 FL — HIGH (ref 7.4–10.4)
POTASSIUM SERPL-MCNC: 5 MMOL/L — SIGNIFICANT CHANGE UP (ref 3.5–5)
POTASSIUM SERPL-SCNC: 5 MMOL/L — SIGNIFICANT CHANGE UP (ref 3.5–5)
PROT SERPL-MCNC: 6.8 G/DL — SIGNIFICANT CHANGE UP (ref 6–8)
RBC # BLD: 4.69 M/UL — SIGNIFICANT CHANGE UP (ref 4.2–5.4)
RBC # FLD: 13.4 % — SIGNIFICANT CHANGE UP (ref 11.5–14.5)
SODIUM SERPL-SCNC: 137 MMOL/L — SIGNIFICANT CHANGE UP (ref 135–146)
WBC # BLD: 8.25 K/UL — SIGNIFICANT CHANGE UP (ref 4.8–10.8)
WBC # FLD AUTO: 8.25 K/UL — SIGNIFICANT CHANGE UP (ref 4.8–10.8)

## 2024-06-18 PROCEDURE — 99285 EMERGENCY DEPT VISIT HI MDM: CPT

## 2024-06-18 PROCEDURE — 71045 X-RAY EXAM CHEST 1 VIEW: CPT

## 2024-06-18 PROCEDURE — 82962 GLUCOSE BLOOD TEST: CPT

## 2024-06-18 PROCEDURE — 83735 ASSAY OF MAGNESIUM: CPT

## 2024-06-18 PROCEDURE — 85027 COMPLETE CBC AUTOMATED: CPT

## 2024-06-18 PROCEDURE — 36415 COLL VENOUS BLD VENIPUNCTURE: CPT

## 2024-06-18 PROCEDURE — 80053 COMPREHEN METABOLIC PANEL: CPT

## 2024-06-18 PROCEDURE — 71045 X-RAY EXAM CHEST 1 VIEW: CPT | Mod: 26

## 2024-06-18 RX ORDER — ACETAMINOPHEN 325 MG
650 TABLET ORAL EVERY 6 HOURS
Refills: 0 | Status: DISCONTINUED | OUTPATIENT
Start: 2024-06-18 | End: 2024-06-19

## 2024-06-18 RX ORDER — DEXTROSE MONOHYDRATE 100 MG/ML
125 INJECTION, SOLUTION INTRAVENOUS ONCE
Refills: 0 | Status: DISCONTINUED | OUTPATIENT
Start: 2024-06-18 | End: 2024-06-19

## 2024-06-18 RX ORDER — DEXTROSE MONOHYDRATE AND SODIUM CHLORIDE 5; .3 G/100ML; G/100ML
1000 INJECTION, SOLUTION INTRAVENOUS
Refills: 0 | Status: DISCONTINUED | OUTPATIENT
Start: 2024-06-18 | End: 2024-06-19

## 2024-06-18 RX ORDER — DEXTROSE 30 % IN WATER 30 %
15 VIAL (ML) INTRAVENOUS ONCE
Refills: 0 | Status: DISCONTINUED | OUTPATIENT
Start: 2024-06-18 | End: 2024-06-19

## 2024-06-18 RX ORDER — DEXTROSE 30 % IN WATER 30 %
12.5 VIAL (ML) INTRAVENOUS ONCE
Refills: 0 | Status: DISCONTINUED | OUTPATIENT
Start: 2024-06-18 | End: 2024-06-19

## 2024-06-18 RX ORDER — INSULIN LISPRO 100 [IU]/ML
INJECTION, SOLUTION SUBCUTANEOUS
Refills: 0 | Status: DISCONTINUED | OUTPATIENT
Start: 2024-06-18 | End: 2024-06-19

## 2024-06-18 RX ORDER — METHYLPREDNISOLONE ACETATE 20 MG/ML
1000 VIAL (ML) INJECTION ONCE
Refills: 0 | Status: COMPLETED | OUTPATIENT
Start: 2024-06-18 | End: 2024-06-18

## 2024-06-18 RX ORDER — ENOXAPARIN SODIUM 100 MG/ML
40 INJECTION SUBCUTANEOUS EVERY 24 HOURS
Refills: 0 | Status: DISCONTINUED | OUTPATIENT
Start: 2024-06-18 | End: 2024-06-19

## 2024-06-18 RX ORDER — METHYLPREDNISOLONE ACETATE 20 MG/ML
1000 VIAL (ML) INJECTION DAILY
Refills: 0 | Status: DISCONTINUED | OUTPATIENT
Start: 2024-06-19 | End: 2024-06-19

## 2024-06-18 RX ORDER — DEXTROSE 30 % IN WATER 30 %
25 VIAL (ML) INTRAVENOUS ONCE
Refills: 0 | Status: DISCONTINUED | OUTPATIENT
Start: 2024-06-18 | End: 2024-06-19

## 2024-06-18 RX ORDER — PANTOPRAZOLE SODIUM 40 MG/10ML
40 INJECTION, POWDER, FOR SOLUTION INTRAVENOUS DAILY
Refills: 0 | Status: DISCONTINUED | OUTPATIENT
Start: 2024-06-18 | End: 2024-06-19

## 2024-06-18 RX ORDER — GLUCAGON HYDROCHLORIDE 1 MG/ML
1 INJECTION, POWDER, FOR SOLUTION INTRAMUSCULAR; INTRAVENOUS; SUBCUTANEOUS ONCE
Refills: 0 | Status: DISCONTINUED | OUTPATIENT
Start: 2024-06-18 | End: 2024-06-19

## 2024-06-18 RX ADMIN — Medication 50 MILLIGRAM(S): at 14:22

## 2024-06-18 RX ADMIN — Medication 650 MILLIGRAM(S): at 17:02

## 2024-06-18 RX ADMIN — Medication 3 MILLIGRAM(S): at 21:14

## 2024-06-19 ENCOUNTER — TRANSCRIPTION ENCOUNTER (OUTPATIENT)
Age: 59
End: 2024-06-19

## 2024-06-19 VITALS — RESPIRATION RATE: 18 BRPM

## 2024-06-19 LAB
ALBUMIN SERPL ELPH-MCNC: 4.6 G/DL — SIGNIFICANT CHANGE UP (ref 3.5–5.2)
ALP SERPL-CCNC: 65 U/L — SIGNIFICANT CHANGE UP (ref 30–115)
ALT FLD-CCNC: 75 U/L — HIGH (ref 0–41)
ANION GAP SERPL CALC-SCNC: 18 MMOL/L — HIGH (ref 7–14)
AST SERPL-CCNC: 40 U/L — SIGNIFICANT CHANGE UP (ref 0–41)
BILIRUB SERPL-MCNC: 0.3 MG/DL — SIGNIFICANT CHANGE UP (ref 0.2–1.2)
BUN SERPL-MCNC: 16 MG/DL — SIGNIFICANT CHANGE UP (ref 10–20)
CALCIUM SERPL-MCNC: 9.9 MG/DL — SIGNIFICANT CHANGE UP (ref 8.4–10.4)
CHLORIDE SERPL-SCNC: 103 MMOL/L — SIGNIFICANT CHANGE UP (ref 98–110)
CO2 SERPL-SCNC: 20 MMOL/L — SIGNIFICANT CHANGE UP (ref 17–32)
CREAT SERPL-MCNC: 0.7 MG/DL — SIGNIFICANT CHANGE UP (ref 0.7–1.5)
EGFR: 99 ML/MIN/1.73M2 — SIGNIFICANT CHANGE UP
GLUCOSE BLDC GLUCOMTR-MCNC: 135 MG/DL — HIGH (ref 70–99)
GLUCOSE SERPL-MCNC: 144 MG/DL — HIGH (ref 70–99)
HCT VFR BLD CALC: 41 % — SIGNIFICANT CHANGE UP (ref 37–47)
HGB BLD-MCNC: 14.3 G/DL — SIGNIFICANT CHANGE UP (ref 12–16)
MAGNESIUM SERPL-MCNC: 2.1 MG/DL — SIGNIFICANT CHANGE UP (ref 1.8–2.4)
MCHC RBC-ENTMCNC: 32 PG — HIGH (ref 27–31)
MCHC RBC-ENTMCNC: 34.9 G/DL — SIGNIFICANT CHANGE UP (ref 32–37)
MCV RBC AUTO: 91.7 FL — SIGNIFICANT CHANGE UP (ref 81–99)
NRBC # BLD: 0 /100 WBCS — SIGNIFICANT CHANGE UP (ref 0–0)
PLATELET # BLD AUTO: 194 K/UL — SIGNIFICANT CHANGE UP (ref 130–400)
PMV BLD: 10.8 FL — HIGH (ref 7.4–10.4)
POTASSIUM SERPL-MCNC: 4.6 MMOL/L — SIGNIFICANT CHANGE UP (ref 3.5–5)
POTASSIUM SERPL-SCNC: 4.6 MMOL/L — SIGNIFICANT CHANGE UP (ref 3.5–5)
PROT SERPL-MCNC: 6.5 G/DL — SIGNIFICANT CHANGE UP (ref 6–8)
RBC # BLD: 4.47 M/UL — SIGNIFICANT CHANGE UP (ref 4.2–5.4)
RBC # FLD: 13.2 % — SIGNIFICANT CHANGE UP (ref 11.5–14.5)
SODIUM SERPL-SCNC: 141 MMOL/L — SIGNIFICANT CHANGE UP (ref 135–146)
WBC # BLD: 11.73 K/UL — HIGH (ref 4.8–10.8)
WBC # FLD AUTO: 11.73 K/UL — HIGH (ref 4.8–10.8)

## 2024-06-19 PROCEDURE — 99239 HOSP IP/OBS DSCHRG MGMT >30: CPT

## 2024-06-19 RX ORDER — FLUOXETINE HCL 10 MG
1 CAPSULE ORAL
Qty: 0 | Refills: 0 | DISCHARGE

## 2024-06-19 RX ORDER — SIMVASTATIN 40 MG
1 TABLET ORAL
Refills: 0 | DISCHARGE

## 2024-06-19 RX ORDER — GLATIRAMER ACETATE 20 MG/ML
40 INJECTION, SOLUTION SUBCUTANEOUS
Qty: 0 | Refills: 0 | DISCHARGE

## 2024-06-19 RX ORDER — SERTRALINE HYDROCHLORIDE 100 MG/1
1 TABLET, FILM COATED ORAL
Refills: 0 | DISCHARGE

## 2024-06-19 RX ADMIN — Medication 50 MILLIGRAM(S): at 05:34

## 2024-06-20 ENCOUNTER — INPATIENT (INPATIENT)
Facility: HOSPITAL | Age: 59
LOS: 6 days | Discharge: NURSING, REHAB OR LONG-TERM CARE | DRG: 301 | End: 2024-06-27
Attending: HOSPITALIST | Admitting: INTERNAL MEDICINE
Payer: MEDICAID

## 2024-06-20 VITALS
RESPIRATION RATE: 18 BRPM | OXYGEN SATURATION: 99 % | SYSTOLIC BLOOD PRESSURE: 136 MMHG | WEIGHT: 154.98 LBS | DIASTOLIC BLOOD PRESSURE: 70 MMHG | HEIGHT: 69 IN | TEMPERATURE: 98 F | HEART RATE: 80 BPM

## 2024-06-20 DIAGNOSIS — S72.009A FRACTURE OF UNSPECIFIED PART OF NECK OF UNSPECIFIED FEMUR, INITIAL ENCOUNTER FOR CLOSED FRACTURE: ICD-10-CM

## 2024-06-20 LAB
ACANTHOCYTES BLD QL SMEAR: SLIGHT — SIGNIFICANT CHANGE UP
ALBUMIN SERPL ELPH-MCNC: 4.8 G/DL — SIGNIFICANT CHANGE UP (ref 3.5–5.2)
ALP SERPL-CCNC: 72 U/L — SIGNIFICANT CHANGE UP (ref 30–115)
ALT FLD-CCNC: 77 U/L — HIGH (ref 0–41)
ANION GAP SERPL CALC-SCNC: 13 MMOL/L — SIGNIFICANT CHANGE UP (ref 7–14)
APTT BLD: 26.4 SEC — LOW (ref 27–39.2)
AST SERPL-CCNC: 37 U/L — SIGNIFICANT CHANGE UP (ref 0–41)
BASOPHILS # BLD AUTO: 0 K/UL — SIGNIFICANT CHANGE UP (ref 0–0.2)
BASOPHILS NFR BLD AUTO: 0 % — SIGNIFICANT CHANGE UP (ref 0–1)
BILIRUB SERPL-MCNC: 0.3 MG/DL — SIGNIFICANT CHANGE UP (ref 0.2–1.2)
BLD GP AB SCN SERPL QL: SIGNIFICANT CHANGE UP
BUN SERPL-MCNC: 23 MG/DL — HIGH (ref 10–20)
BURR CELLS BLD QL SMEAR: PRESENT — SIGNIFICANT CHANGE UP
CALCIUM SERPL-MCNC: 9.8 MG/DL — SIGNIFICANT CHANGE UP (ref 8.4–10.4)
CHLORIDE SERPL-SCNC: 103 MMOL/L — SIGNIFICANT CHANGE UP (ref 98–110)
CO2 SERPL-SCNC: 26 MMOL/L — SIGNIFICANT CHANGE UP (ref 17–32)
CREAT SERPL-MCNC: 0.9 MG/DL — SIGNIFICANT CHANGE UP (ref 0.7–1.5)
DACRYOCYTES BLD QL SMEAR: SLIGHT — SIGNIFICANT CHANGE UP
EGFR: 73 ML/MIN/1.73M2 — SIGNIFICANT CHANGE UP
EOSINOPHIL # BLD AUTO: 0.18 K/UL — SIGNIFICANT CHANGE UP (ref 0–0.7)
EOSINOPHIL NFR BLD AUTO: 0.9 % — SIGNIFICANT CHANGE UP (ref 0–8)
GAS PNL BLDV: SIGNIFICANT CHANGE UP
GIANT PLATELETS BLD QL SMEAR: PRESENT — SIGNIFICANT CHANGE UP
GLUCOSE SERPL-MCNC: 83 MG/DL — SIGNIFICANT CHANGE UP (ref 70–99)
HCT VFR BLD CALC: 45.3 % — SIGNIFICANT CHANGE UP (ref 37–47)
HGB BLD-MCNC: 15.5 G/DL — SIGNIFICANT CHANGE UP (ref 12–16)
INR BLD: 0.95 RATIO — SIGNIFICANT CHANGE UP (ref 0.65–1.3)
LYMPHOCYTES # BLD AUTO: 10.6 % — LOW (ref 20.5–51.1)
LYMPHOCYTES # BLD AUTO: 2.16 K/UL — SIGNIFICANT CHANGE UP (ref 1.2–3.4)
MANUAL SMEAR VERIFICATION: SIGNIFICANT CHANGE UP
MCHC RBC-ENTMCNC: 32 PG — HIGH (ref 27–31)
MCHC RBC-ENTMCNC: 34.2 G/DL — SIGNIFICANT CHANGE UP (ref 32–37)
MCV RBC AUTO: 93.4 FL — SIGNIFICANT CHANGE UP (ref 81–99)
MONOCYTES # BLD AUTO: 1.08 K/UL — HIGH (ref 0.1–0.6)
MONOCYTES NFR BLD AUTO: 5.3 % — SIGNIFICANT CHANGE UP (ref 1.7–9.3)
NEUTROPHILS # BLD AUTO: 16.8 K/UL — HIGH (ref 1.4–6.5)
NEUTROPHILS NFR BLD AUTO: 82.3 % — HIGH (ref 42.2–75.2)
PLAT MORPH BLD: ABNORMAL
PLATELET # BLD AUTO: 191 K/UL — SIGNIFICANT CHANGE UP (ref 130–400)
PMV BLD: 10.3 FL — SIGNIFICANT CHANGE UP (ref 7.4–10.4)
POIKILOCYTOSIS BLD QL AUTO: SLIGHT — SIGNIFICANT CHANGE UP
POTASSIUM SERPL-MCNC: 6 MMOL/L — CRITICAL HIGH (ref 3.5–5)
POTASSIUM SERPL-SCNC: 6 MMOL/L — CRITICAL HIGH (ref 3.5–5)
PROT SERPL-MCNC: 6.8 G/DL — SIGNIFICANT CHANGE UP (ref 6–8)
PROTHROM AB SERPL-ACNC: 10.8 SEC — SIGNIFICANT CHANGE UP (ref 9.95–12.87)
RBC # BLD: 4.85 M/UL — SIGNIFICANT CHANGE UP (ref 4.2–5.4)
RBC # FLD: 13.6 % — SIGNIFICANT CHANGE UP (ref 11.5–14.5)
RBC BLD AUTO: ABNORMAL
SODIUM SERPL-SCNC: 142 MMOL/L — SIGNIFICANT CHANGE UP (ref 135–146)
VARIANT LYMPHS # BLD: 0.9 % — SIGNIFICANT CHANGE UP (ref 0–5)
WBC # BLD: 20.41 K/UL — HIGH (ref 4.8–10.8)
WBC # FLD AUTO: 20.41 K/UL — HIGH (ref 4.8–10.8)

## 2024-06-20 PROCEDURE — C1713: CPT

## 2024-06-20 PROCEDURE — 85610 PROTHROMBIN TIME: CPT

## 2024-06-20 PROCEDURE — 86850 RBC ANTIBODY SCREEN: CPT

## 2024-06-20 PROCEDURE — 88311 DECALCIFY TISSUE: CPT

## 2024-06-20 PROCEDURE — 97530 THERAPEUTIC ACTIVITIES: CPT | Mod: GP

## 2024-06-20 PROCEDURE — 73502 X-RAY EXAM HIP UNI 2-3 VIEWS: CPT | Mod: 26,RT

## 2024-06-20 PROCEDURE — 36000 PLACE NEEDLE IN VEIN: CPT

## 2024-06-20 PROCEDURE — 97535 SELF CARE MNGMENT TRAINING: CPT | Mod: GO

## 2024-06-20 PROCEDURE — 36415 COLL VENOUS BLD VENIPUNCTURE: CPT

## 2024-06-20 PROCEDURE — 80048 BASIC METABOLIC PNL TOTAL CA: CPT

## 2024-06-20 PROCEDURE — 83735 ASSAY OF MAGNESIUM: CPT

## 2024-06-20 PROCEDURE — 71045 X-RAY EXAM CHEST 1 VIEW: CPT | Mod: 26

## 2024-06-20 PROCEDURE — 86901 BLOOD TYPING SEROLOGIC RH(D): CPT

## 2024-06-20 PROCEDURE — 99285 EMERGENCY DEPT VISIT HI MDM: CPT | Mod: 25

## 2024-06-20 PROCEDURE — 73501 X-RAY EXAM HIP UNI 1 VIEW: CPT | Mod: RT

## 2024-06-20 PROCEDURE — 85730 THROMBOPLASTIN TIME PARTIAL: CPT

## 2024-06-20 PROCEDURE — C1889: CPT

## 2024-06-20 PROCEDURE — 97167 OT EVAL HIGH COMPLEX 60 MIN: CPT | Mod: GO

## 2024-06-20 PROCEDURE — 99223 1ST HOSP IP/OBS HIGH 75: CPT

## 2024-06-20 PROCEDURE — C1776: CPT

## 2024-06-20 PROCEDURE — 97161 PT EVAL LOW COMPLEX 20 MIN: CPT | Mod: GP

## 2024-06-20 PROCEDURE — 82607 VITAMIN B-12: CPT

## 2024-06-20 PROCEDURE — 73562 X-RAY EXAM OF KNEE 3: CPT | Mod: 26,RT

## 2024-06-20 PROCEDURE — 93010 ELECTROCARDIOGRAM REPORT: CPT

## 2024-06-20 PROCEDURE — 72170 X-RAY EXAM OF PELVIS: CPT

## 2024-06-20 PROCEDURE — 97116 GAIT TRAINING THERAPY: CPT | Mod: GP

## 2024-06-20 PROCEDURE — 84443 ASSAY THYROID STIM HORMONE: CPT

## 2024-06-20 PROCEDURE — 85027 COMPLETE CBC AUTOMATED: CPT

## 2024-06-20 PROCEDURE — 82746 ASSAY OF FOLIC ACID SERUM: CPT

## 2024-06-20 PROCEDURE — 88305 TISSUE EXAM BY PATHOLOGIST: CPT

## 2024-06-20 PROCEDURE — 80053 COMPREHEN METABOLIC PANEL: CPT

## 2024-06-20 PROCEDURE — 97110 THERAPEUTIC EXERCISES: CPT | Mod: GP

## 2024-06-20 PROCEDURE — 73552 X-RAY EXAM OF FEMUR 2/>: CPT | Mod: 26,RT

## 2024-06-20 PROCEDURE — 85025 COMPLETE CBC W/AUTO DIFF WBC: CPT

## 2024-06-20 PROCEDURE — 86900 BLOOD TYPING SEROLOGIC ABO: CPT

## 2024-06-20 PROCEDURE — C9399: CPT

## 2024-06-20 RX ORDER — ATORVASTATIN CALCIUM 20 MG/1
40 TABLET, FILM COATED ORAL AT BEDTIME
Refills: 0 | Status: DISCONTINUED | OUTPATIENT
Start: 2024-06-20 | End: 2024-06-21

## 2024-06-20 RX ORDER — DEXTROSE MONOHYDRATE AND SODIUM CHLORIDE 5; .3 G/100ML; G/100ML
1000 INJECTION, SOLUTION INTRAVENOUS
Refills: 0 | Status: DISCONTINUED | OUTPATIENT
Start: 2024-06-21 | End: 2024-06-21

## 2024-06-20 RX ORDER — ACETAMINOPHEN 325 MG
650 TABLET ORAL EVERY 6 HOURS
Refills: 0 | Status: DISCONTINUED | OUTPATIENT
Start: 2024-06-20 | End: 2024-06-21

## 2024-06-20 RX ORDER — SERTRALINE HYDROCHLORIDE 100 MG/1
100 TABLET, FILM COATED ORAL DAILY
Refills: 0 | Status: DISCONTINUED | OUTPATIENT
Start: 2024-06-20 | End: 2024-06-21

## 2024-06-20 RX ORDER — ONDANSETRON HYDROCHLORIDE 2 MG/ML
4 INJECTION INTRAMUSCULAR; INTRAVENOUS EVERY 8 HOURS
Refills: 0 | Status: DISCONTINUED | OUTPATIENT
Start: 2024-06-20 | End: 2024-06-21

## 2024-06-20 RX ORDER — MORPHINE SULFATE 100 MG/1
4 TABLET, EXTENDED RELEASE ORAL ONCE
Refills: 0 | Status: DISCONTINUED | OUTPATIENT
Start: 2024-06-20 | End: 2024-06-20

## 2024-06-20 RX ORDER — PANTOPRAZOLE SODIUM 40 MG/10ML
40 INJECTION, POWDER, FOR SOLUTION INTRAVENOUS
Refills: 0 | Status: DISCONTINUED | OUTPATIENT
Start: 2024-06-20 | End: 2024-06-21

## 2024-06-20 RX ORDER — MODAFINIL 100 MG/1
150 TABLET ORAL DAILY
Refills: 0 | Status: DISCONTINUED | OUTPATIENT
Start: 2024-06-20 | End: 2024-06-21

## 2024-06-20 RX ORDER — ENOXAPARIN SODIUM 100 MG/ML
40 INJECTION SUBCUTANEOUS ONCE
Refills: 0 | Status: COMPLETED | OUTPATIENT
Start: 2024-06-20 | End: 2024-06-20

## 2024-06-20 RX ORDER — HYDROMORPHONE HCL 0.2 MG/ML
2 INJECTION, SOLUTION INTRAVENOUS ONCE
Refills: 0 | Status: DISCONTINUED | OUTPATIENT
Start: 2024-06-20 | End: 2024-06-20

## 2024-06-20 RX ORDER — MORPHINE SULFATE 100 MG/1
8 TABLET, EXTENDED RELEASE ORAL ONCE
Refills: 0 | Status: DISCONTINUED | OUTPATIENT
Start: 2024-06-20 | End: 2024-06-20

## 2024-06-20 RX ORDER — MORPHINE SULFATE 100 MG/1
2 TABLET, EXTENDED RELEASE ORAL EVERY 8 HOURS
Refills: 0 | Status: DISCONTINUED | OUTPATIENT
Start: 2024-06-20 | End: 2024-06-21

## 2024-06-20 RX ORDER — DIAZEPAM 10 MG/1
1 TABLET ORAL
Refills: 0 | DISCHARGE

## 2024-06-20 RX ORDER — MAGNESIUM, ALUMINUM HYDROXIDE 400-400
30 TABLET,CHEWABLE ORAL EVERY 4 HOURS
Refills: 0 | Status: DISCONTINUED | OUTPATIENT
Start: 2024-06-20 | End: 2024-06-21

## 2024-06-20 RX ORDER — ACETAMINOPHEN 325 MG
975 TABLET ORAL ONCE
Refills: 0 | Status: COMPLETED | OUTPATIENT
Start: 2024-06-20 | End: 2024-06-20

## 2024-06-20 RX ORDER — BUPIVACAINE HYDROCHLORIDE 5 MG/ML
20 INJECTION, SOLUTION EPIDURAL; INTRACAUDAL; PERINEURAL ONCE
Refills: 0 | Status: COMPLETED | OUTPATIENT
Start: 2024-06-20 | End: 2024-06-20

## 2024-06-20 RX ADMIN — HYDROMORPHONE HCL 2 MILLIGRAM(S): 0.2 INJECTION, SOLUTION INTRAVENOUS at 18:52

## 2024-06-20 RX ADMIN — MORPHINE SULFATE 2 MILLIGRAM(S): 100 TABLET, EXTENDED RELEASE ORAL at 22:45

## 2024-06-20 RX ADMIN — Medication 975 MILLIGRAM(S): at 14:08

## 2024-06-20 RX ADMIN — ATORVASTATIN CALCIUM 40 MILLIGRAM(S): 20 TABLET, FILM COATED ORAL at 22:46

## 2024-06-20 RX ADMIN — MORPHINE SULFATE 4 MILLIGRAM(S): 100 TABLET, EXTENDED RELEASE ORAL at 17:18

## 2024-06-20 RX ADMIN — Medication 600 MILLIGRAM(S): at 14:08

## 2024-06-20 RX ADMIN — MORPHINE SULFATE 8 MILLIGRAM(S): 100 TABLET, EXTENDED RELEASE ORAL at 14:41

## 2024-06-21 ENCOUNTER — RESULT REVIEW (OUTPATIENT)
Age: 59
End: 2024-06-21

## 2024-06-21 LAB
ALBUMIN SERPL ELPH-MCNC: 4.2 G/DL — SIGNIFICANT CHANGE UP (ref 3.5–5.2)
ALP SERPL-CCNC: 61 U/L — SIGNIFICANT CHANGE UP (ref 30–115)
ALT FLD-CCNC: 51 U/L — HIGH (ref 0–41)
ANION GAP SERPL CALC-SCNC: 12 MMOL/L — SIGNIFICANT CHANGE UP (ref 7–14)
ANION GAP SERPL CALC-SCNC: 13 MMOL/L — SIGNIFICANT CHANGE UP (ref 7–14)
APTT BLD: 27.1 SEC — SIGNIFICANT CHANGE UP (ref 27–39.2)
AST SERPL-CCNC: 23 U/L — SIGNIFICANT CHANGE UP (ref 0–41)
BASOPHILS # BLD AUTO: 0.04 K/UL — SIGNIFICANT CHANGE UP (ref 0–0.2)
BASOPHILS NFR BLD AUTO: 0.3 % — SIGNIFICANT CHANGE UP (ref 0–1)
BILIRUB SERPL-MCNC: 0.6 MG/DL — SIGNIFICANT CHANGE UP (ref 0.2–1.2)
BUN SERPL-MCNC: 15 MG/DL — SIGNIFICANT CHANGE UP (ref 10–20)
BUN SERPL-MCNC: 19 MG/DL — SIGNIFICANT CHANGE UP (ref 10–20)
CALCIUM SERPL-MCNC: 9.3 MG/DL — SIGNIFICANT CHANGE UP (ref 8.4–10.5)
CALCIUM SERPL-MCNC: 9.3 MG/DL — SIGNIFICANT CHANGE UP (ref 8.4–10.5)
CHLORIDE SERPL-SCNC: 103 MMOL/L — SIGNIFICANT CHANGE UP (ref 98–110)
CHLORIDE SERPL-SCNC: 103 MMOL/L — SIGNIFICANT CHANGE UP (ref 98–110)
CO2 SERPL-SCNC: 26 MMOL/L — SIGNIFICANT CHANGE UP (ref 17–32)
CO2 SERPL-SCNC: 27 MMOL/L — SIGNIFICANT CHANGE UP (ref 17–32)
CREAT SERPL-MCNC: 0.7 MG/DL — SIGNIFICANT CHANGE UP (ref 0.7–1.5)
CREAT SERPL-MCNC: 0.8 MG/DL — SIGNIFICANT CHANGE UP (ref 0.7–1.5)
EGFR: 84 ML/MIN/1.73M2 — SIGNIFICANT CHANGE UP
EGFR: 99 ML/MIN/1.73M2 — SIGNIFICANT CHANGE UP
EOSINOPHIL # BLD AUTO: 0.11 K/UL — SIGNIFICANT CHANGE UP (ref 0–0.7)
EOSINOPHIL NFR BLD AUTO: 0.9 % — SIGNIFICANT CHANGE UP (ref 0–8)
GLUCOSE SERPL-MCNC: 88 MG/DL — SIGNIFICANT CHANGE UP (ref 70–99)
GLUCOSE SERPL-MCNC: 89 MG/DL — SIGNIFICANT CHANGE UP (ref 70–99)
HCT VFR BLD CALC: 42.5 % — SIGNIFICANT CHANGE UP (ref 37–47)
HGB BLD-MCNC: 14.4 G/DL — SIGNIFICANT CHANGE UP (ref 12–16)
IMM GRANULOCYTES NFR BLD AUTO: 0.2 % — SIGNIFICANT CHANGE UP (ref 0.1–0.3)
INR BLD: 1.02 RATIO — SIGNIFICANT CHANGE UP (ref 0.65–1.3)
LYMPHOCYTES # BLD AUTO: 1.34 K/UL — SIGNIFICANT CHANGE UP (ref 1.2–3.4)
LYMPHOCYTES # BLD AUTO: 10.7 % — LOW (ref 20.5–51.1)
MCHC RBC-ENTMCNC: 31.9 PG — HIGH (ref 27–31)
MCHC RBC-ENTMCNC: 33.9 G/DL — SIGNIFICANT CHANGE UP (ref 32–37)
MCV RBC AUTO: 94.2 FL — SIGNIFICANT CHANGE UP (ref 81–99)
MONOCYTES # BLD AUTO: 1.15 K/UL — HIGH (ref 0.1–0.6)
MONOCYTES NFR BLD AUTO: 9.1 % — SIGNIFICANT CHANGE UP (ref 1.7–9.3)
NEUTROPHILS # BLD AUTO: 9.91 K/UL — HIGH (ref 1.4–6.5)
NEUTROPHILS NFR BLD AUTO: 78.8 % — HIGH (ref 42.2–75.2)
NRBC # BLD: 0 /100 WBCS — SIGNIFICANT CHANGE UP (ref 0–0)
PLATELET # BLD AUTO: 178 K/UL — SIGNIFICANT CHANGE UP (ref 130–400)
PMV BLD: 10.2 FL — SIGNIFICANT CHANGE UP (ref 7.4–10.4)
POTASSIUM SERPL-MCNC: 4.2 MMOL/L — SIGNIFICANT CHANGE UP (ref 3.5–5)
POTASSIUM SERPL-MCNC: 4.3 MMOL/L — SIGNIFICANT CHANGE UP (ref 3.5–5)
POTASSIUM SERPL-SCNC: 4.2 MMOL/L — SIGNIFICANT CHANGE UP (ref 3.5–5)
POTASSIUM SERPL-SCNC: 4.3 MMOL/L — SIGNIFICANT CHANGE UP (ref 3.5–5)
PROT SERPL-MCNC: 6 G/DL — SIGNIFICANT CHANGE UP (ref 6–8)
PROTHROM AB SERPL-ACNC: 11.6 SEC — SIGNIFICANT CHANGE UP (ref 9.95–12.87)
RBC # BLD: 4.51 M/UL — SIGNIFICANT CHANGE UP (ref 4.2–5.4)
RBC # FLD: 13.7 % — SIGNIFICANT CHANGE UP (ref 11.5–14.5)
SODIUM SERPL-SCNC: 142 MMOL/L — SIGNIFICANT CHANGE UP (ref 135–146)
SODIUM SERPL-SCNC: 142 MMOL/L — SIGNIFICANT CHANGE UP (ref 135–146)
WBC # BLD: 12.58 K/UL — HIGH (ref 4.8–10.8)
WBC # FLD AUTO: 12.58 K/UL — HIGH (ref 4.8–10.8)

## 2024-06-21 PROCEDURE — 88305 TISSUE EXAM BY PATHOLOGIST: CPT | Mod: 26

## 2024-06-21 PROCEDURE — 99253 IP/OBS CNSLTJ NEW/EST LOW 45: CPT | Mod: 57

## 2024-06-21 PROCEDURE — 72170 X-RAY EXAM OF PELVIS: CPT | Mod: 26

## 2024-06-21 PROCEDURE — 88311 DECALCIFY TISSUE: CPT | Mod: 26

## 2024-06-21 PROCEDURE — 27236 TREAT THIGH FRACTURE: CPT | Mod: RT

## 2024-06-21 RX ORDER — ATORVASTATIN CALCIUM 20 MG/1
40 TABLET, FILM COATED ORAL AT BEDTIME
Refills: 0 | Status: DISCONTINUED | OUTPATIENT
Start: 2024-06-21 | End: 2024-06-27

## 2024-06-21 RX ORDER — POLYETHYLENE GLYCOL 3350 1 G/G
17 POWDER ORAL AT BEDTIME
Refills: 0 | Status: DISCONTINUED | OUTPATIENT
Start: 2024-06-21 | End: 2024-06-27

## 2024-06-21 RX ORDER — CEFAZOLIN 10 G/1
2000 INJECTION, POWDER, FOR SOLUTION INTRAVENOUS EVERY 8 HOURS
Refills: 0 | Status: COMPLETED | OUTPATIENT
Start: 2024-06-21 | End: 2024-06-22

## 2024-06-21 RX ORDER — DEXTROSE MONOHYDRATE AND SODIUM CHLORIDE 5; .3 G/100ML; G/100ML
1000 INJECTION, SOLUTION INTRAVENOUS
Refills: 0 | Status: DISCONTINUED | OUTPATIENT
Start: 2024-06-21 | End: 2024-06-22

## 2024-06-21 RX ORDER — POLYETHYLENE GLYCOL 3350 1 G/G
17 POWDER ORAL AT BEDTIME
Refills: 0 | Status: DISCONTINUED | OUTPATIENT
Start: 2024-06-21 | End: 2024-06-21

## 2024-06-21 RX ORDER — HYDROMORPHONE HCL 0.2 MG/ML
0.5 INJECTION, SOLUTION INTRAVENOUS ONCE
Refills: 0 | Status: DISCONTINUED | OUTPATIENT
Start: 2024-06-21 | End: 2024-06-21

## 2024-06-21 RX ORDER — MORPHINE SULFATE 100 MG/1
2 TABLET, EXTENDED RELEASE ORAL EVERY 8 HOURS
Refills: 0 | Status: DISCONTINUED | OUTPATIENT
Start: 2024-06-21 | End: 2024-06-22

## 2024-06-21 RX ORDER — SERTRALINE HYDROCHLORIDE 100 MG/1
100 TABLET, FILM COATED ORAL DAILY
Refills: 0 | Status: DISCONTINUED | OUTPATIENT
Start: 2024-06-21 | End: 2024-06-27

## 2024-06-21 RX ORDER — PANTOPRAZOLE SODIUM 40 MG/10ML
40 INJECTION, POWDER, FOR SOLUTION INTRAVENOUS
Refills: 0 | Status: DISCONTINUED | OUTPATIENT
Start: 2024-06-21 | End: 2024-06-27

## 2024-06-21 RX ORDER — ACETAMINOPHEN 325 MG
650 TABLET ORAL EVERY 6 HOURS
Refills: 0 | Status: DISCONTINUED | OUTPATIENT
Start: 2024-06-21 | End: 2024-06-27

## 2024-06-21 RX ORDER — MODAFINIL 100 MG/1
150 TABLET ORAL DAILY
Refills: 0 | Status: DISCONTINUED | OUTPATIENT
Start: 2024-06-21 | End: 2024-06-26

## 2024-06-21 RX ORDER — MEPERIDINE HYDROCHLORIDE 50 MG/1
12.5 TABLET ORAL
Refills: 0 | Status: DISCONTINUED | OUTPATIENT
Start: 2024-06-21 | End: 2024-06-21

## 2024-06-21 RX ORDER — SENNOSIDES 8.6 MG
2 TABLET ORAL AT BEDTIME
Refills: 0 | Status: DISCONTINUED | OUTPATIENT
Start: 2024-06-21 | End: 2024-06-21

## 2024-06-21 RX ORDER — HYDROMORPHONE HCL 0.2 MG/ML
0.5 INJECTION, SOLUTION INTRAVENOUS
Refills: 0 | Status: DISCONTINUED | OUTPATIENT
Start: 2024-06-21 | End: 2024-06-21

## 2024-06-21 RX ORDER — SENNOSIDES 8.6 MG
2 TABLET ORAL AT BEDTIME
Refills: 0 | Status: DISCONTINUED | OUTPATIENT
Start: 2024-06-21 | End: 2024-06-27

## 2024-06-21 RX ORDER — MORPHINE SULFATE 100 MG/1
3 TABLET, EXTENDED RELEASE ORAL EVERY 6 HOURS
Refills: 0 | Status: DISCONTINUED | OUTPATIENT
Start: 2024-06-21 | End: 2024-06-21

## 2024-06-21 RX ORDER — HYDROMORPHONE HCL 0.2 MG/ML
1 INJECTION, SOLUTION INTRAVENOUS
Refills: 0 | Status: DISCONTINUED | OUTPATIENT
Start: 2024-06-21 | End: 2024-06-21

## 2024-06-21 RX ORDER — MORPHINE SULFATE 100 MG/1
2 TABLET, EXTENDED RELEASE ORAL ONCE
Refills: 0 | Status: DISCONTINUED | OUTPATIENT
Start: 2024-06-21 | End: 2024-06-21

## 2024-06-21 RX ORDER — ENOXAPARIN SODIUM 100 MG/ML
40 INJECTION SUBCUTANEOUS EVERY 24 HOURS
Refills: 0 | Status: DISCONTINUED | OUTPATIENT
Start: 2024-06-22 | End: 2024-06-27

## 2024-06-21 RX ORDER — DEXTROSE MONOHYDRATE AND SODIUM CHLORIDE 5; .3 G/100ML; G/100ML
1000 INJECTION, SOLUTION INTRAVENOUS
Refills: 0 | Status: DISCONTINUED | OUTPATIENT
Start: 2024-06-21 | End: 2024-06-21

## 2024-06-21 RX ORDER — ONDANSETRON HYDROCHLORIDE 2 MG/ML
4 INJECTION INTRAMUSCULAR; INTRAVENOUS EVERY 8 HOURS
Refills: 0 | Status: DISCONTINUED | OUTPATIENT
Start: 2024-06-21 | End: 2024-06-27

## 2024-06-21 RX ORDER — PROCHLORPERAZINE MALEATE 10 MG/1
10 TABLET, FILM COATED ORAL ONCE
Refills: 0 | Status: DISCONTINUED | OUTPATIENT
Start: 2024-06-21 | End: 2024-06-27

## 2024-06-21 RX ADMIN — MORPHINE SULFATE 2 MILLIGRAM(S): 100 TABLET, EXTENDED RELEASE ORAL at 06:15

## 2024-06-21 RX ADMIN — SERTRALINE HYDROCHLORIDE 100 MILLIGRAM(S): 100 TABLET, FILM COATED ORAL at 23:10

## 2024-06-21 RX ADMIN — HYDROMORPHONE HCL 1 MILLIGRAM(S): 0.2 INJECTION, SOLUTION INTRAVENOUS at 18:05

## 2024-06-21 RX ADMIN — ATORVASTATIN CALCIUM 40 MILLIGRAM(S): 20 TABLET, FILM COATED ORAL at 23:09

## 2024-06-21 RX ADMIN — Medication 2 TABLET(S): at 23:09

## 2024-06-21 RX ADMIN — Medication 650 MILLIGRAM(S): at 02:49

## 2024-06-21 RX ADMIN — CEFAZOLIN 100 MILLIGRAM(S): 10 INJECTION, POWDER, FOR SOLUTION INTRAVENOUS at 23:10

## 2024-06-21 RX ADMIN — HYDROMORPHONE HCL 0.5 MILLIGRAM(S): 0.2 INJECTION, SOLUTION INTRAVENOUS at 00:24

## 2024-06-21 RX ADMIN — MORPHINE SULFATE 2 MILLIGRAM(S): 100 TABLET, EXTENDED RELEASE ORAL at 23:10

## 2024-06-21 RX ADMIN — MORPHINE SULFATE 2 MILLIGRAM(S): 100 TABLET, EXTENDED RELEASE ORAL at 23:40

## 2024-06-21 RX ADMIN — MORPHINE SULFATE 2 MILLIGRAM(S): 100 TABLET, EXTENDED RELEASE ORAL at 08:50

## 2024-06-21 RX ADMIN — HYDROMORPHONE HCL 1 MILLIGRAM(S): 0.2 INJECTION, SOLUTION INTRAVENOUS at 17:55

## 2024-06-21 RX ADMIN — MORPHINE SULFATE 2 MILLIGRAM(S): 100 TABLET, EXTENDED RELEASE ORAL at 07:06

## 2024-06-21 RX ADMIN — HYDROMORPHONE HCL 0.5 MILLIGRAM(S): 0.2 INJECTION, SOLUTION INTRAVENOUS at 01:24

## 2024-06-21 RX ADMIN — HYDROMORPHONE HCL 1 MILLIGRAM(S): 0.2 INJECTION, SOLUTION INTRAVENOUS at 19:01

## 2024-06-21 RX ADMIN — Medication 650 MILLIGRAM(S): at 03:49

## 2024-06-22 LAB
ANION GAP SERPL CALC-SCNC: 14 MMOL/L — SIGNIFICANT CHANGE UP (ref 7–14)
BUN SERPL-MCNC: 13 MG/DL — SIGNIFICANT CHANGE UP (ref 10–20)
CALCIUM SERPL-MCNC: 8.9 MG/DL — SIGNIFICANT CHANGE UP (ref 8.4–10.5)
CHLORIDE SERPL-SCNC: 100 MMOL/L — SIGNIFICANT CHANGE UP (ref 98–110)
CO2 SERPL-SCNC: 25 MMOL/L — SIGNIFICANT CHANGE UP (ref 17–32)
CREAT SERPL-MCNC: 0.7 MG/DL — SIGNIFICANT CHANGE UP (ref 0.7–1.5)
EGFR: 99 ML/MIN/1.73M2 — SIGNIFICANT CHANGE UP
GLUCOSE SERPL-MCNC: 116 MG/DL — HIGH (ref 70–99)
HCT VFR BLD CALC: 35.9 % — LOW (ref 37–47)
HGB BLD-MCNC: 12.4 G/DL — SIGNIFICANT CHANGE UP (ref 12–16)
MCHC RBC-ENTMCNC: 31.8 PG — HIGH (ref 27–31)
MCHC RBC-ENTMCNC: 34.5 G/DL — SIGNIFICANT CHANGE UP (ref 32–37)
MCV RBC AUTO: 92.1 FL — SIGNIFICANT CHANGE UP (ref 81–99)
NRBC # BLD: 0 /100 WBCS — SIGNIFICANT CHANGE UP (ref 0–0)
PLATELET # BLD AUTO: 177 K/UL — SIGNIFICANT CHANGE UP (ref 130–400)
PMV BLD: 10.2 FL — SIGNIFICANT CHANGE UP (ref 7.4–10.4)
POTASSIUM SERPL-MCNC: 4.2 MMOL/L — SIGNIFICANT CHANGE UP (ref 3.5–5)
POTASSIUM SERPL-SCNC: 4.2 MMOL/L — SIGNIFICANT CHANGE UP (ref 3.5–5)
RBC # BLD: 3.9 M/UL — LOW (ref 4.2–5.4)
RBC # FLD: 13.2 % — SIGNIFICANT CHANGE UP (ref 11.5–14.5)
SODIUM SERPL-SCNC: 139 MMOL/L — SIGNIFICANT CHANGE UP (ref 135–146)
WBC # BLD: 13.68 K/UL — HIGH (ref 4.8–10.8)
WBC # FLD AUTO: 13.68 K/UL — HIGH (ref 4.8–10.8)

## 2024-06-22 RX ORDER — MORPHINE SULFATE 100 MG/1
2 TABLET, EXTENDED RELEASE ORAL ONCE
Refills: 0 | Status: DISCONTINUED | OUTPATIENT
Start: 2024-06-22 | End: 2024-06-22

## 2024-06-22 RX ORDER — LIDOCAINE HCL 28 MG/G
1 GEL TOPICAL ONCE
Refills: 0 | Status: COMPLETED | OUTPATIENT
Start: 2024-06-22 | End: 2024-06-22

## 2024-06-22 RX ORDER — MORPHINE SULFATE 100 MG/1
2 TABLET, EXTENDED RELEASE ORAL EVERY 6 HOURS
Refills: 0 | Status: DISCONTINUED | OUTPATIENT
Start: 2024-06-22 | End: 2024-06-24

## 2024-06-22 RX ADMIN — CEFAZOLIN 100 MILLIGRAM(S): 10 INJECTION, POWDER, FOR SOLUTION INTRAVENOUS at 15:28

## 2024-06-22 RX ADMIN — Medication 400 MILLIGRAM(S): at 17:51

## 2024-06-22 RX ADMIN — ATORVASTATIN CALCIUM 40 MILLIGRAM(S): 20 TABLET, FILM COATED ORAL at 21:56

## 2024-06-22 RX ADMIN — PANTOPRAZOLE SODIUM 40 MILLIGRAM(S): 40 INJECTION, POWDER, FOR SOLUTION INTRAVENOUS at 06:57

## 2024-06-22 RX ADMIN — MORPHINE SULFATE 2 MILLIGRAM(S): 100 TABLET, EXTENDED RELEASE ORAL at 08:06

## 2024-06-22 RX ADMIN — ENOXAPARIN SODIUM 40 MILLIGRAM(S): 100 INJECTION SUBCUTANEOUS at 06:56

## 2024-06-22 RX ADMIN — MORPHINE SULFATE 2 MILLIGRAM(S): 100 TABLET, EXTENDED RELEASE ORAL at 09:00

## 2024-06-22 RX ADMIN — SERTRALINE HYDROCHLORIDE 100 MILLIGRAM(S): 100 TABLET, FILM COATED ORAL at 14:10

## 2024-06-22 RX ADMIN — CEFAZOLIN 100 MILLIGRAM(S): 10 INJECTION, POWDER, FOR SOLUTION INTRAVENOUS at 06:57

## 2024-06-22 RX ADMIN — DEXTROSE MONOHYDRATE AND SODIUM CHLORIDE 70 MILLILITER(S): 5; .3 INJECTION, SOLUTION INTRAVENOUS at 01:54

## 2024-06-22 RX ADMIN — POLYETHYLENE GLYCOL 3350 17 GRAM(S): 1 POWDER ORAL at 22:00

## 2024-06-22 RX ADMIN — LIDOCAINE HCL 1 PATCH: 28 GEL TOPICAL at 19:28

## 2024-06-22 RX ADMIN — MORPHINE SULFATE 2 MILLIGRAM(S): 100 TABLET, EXTENDED RELEASE ORAL at 15:33

## 2024-06-22 RX ADMIN — MODAFINIL 150 MILLIGRAM(S): 100 TABLET ORAL at 14:10

## 2024-06-22 RX ADMIN — LIDOCAINE HCL 1 PATCH: 28 GEL TOPICAL at 14:09

## 2024-06-22 RX ADMIN — Medication 2 TABLET(S): at 21:56

## 2024-06-22 RX ADMIN — Medication 3 MILLIGRAM(S): at 21:56

## 2024-06-22 RX ADMIN — MORPHINE SULFATE 2 MILLIGRAM(S): 100 TABLET, EXTENDED RELEASE ORAL at 22:30

## 2024-06-22 RX ADMIN — MORPHINE SULFATE 2 MILLIGRAM(S): 100 TABLET, EXTENDED RELEASE ORAL at 21:56

## 2024-06-23 LAB
ANION GAP SERPL CALC-SCNC: 13 MMOL/L — SIGNIFICANT CHANGE UP (ref 7–14)
BUN SERPL-MCNC: 12 MG/DL — SIGNIFICANT CHANGE UP (ref 10–20)
CALCIUM SERPL-MCNC: 8.8 MG/DL — SIGNIFICANT CHANGE UP (ref 8.4–10.5)
CHLORIDE SERPL-SCNC: 98 MMOL/L — SIGNIFICANT CHANGE UP (ref 98–110)
CO2 SERPL-SCNC: 28 MMOL/L — SIGNIFICANT CHANGE UP (ref 17–32)
CREAT SERPL-MCNC: 0.6 MG/DL — LOW (ref 0.7–1.5)
EGFR: 103 ML/MIN/1.73M2 — SIGNIFICANT CHANGE UP
GLUCOSE SERPL-MCNC: 102 MG/DL — HIGH (ref 70–99)
HCT VFR BLD CALC: 33.1 % — LOW (ref 37–47)
HGB BLD-MCNC: 11.5 G/DL — LOW (ref 12–16)
MCHC RBC-ENTMCNC: 32.7 PG — HIGH (ref 27–31)
MCHC RBC-ENTMCNC: 34.7 G/DL — SIGNIFICANT CHANGE UP (ref 32–37)
MCV RBC AUTO: 94 FL — SIGNIFICANT CHANGE UP (ref 81–99)
NRBC # BLD: 0 /100 WBCS — SIGNIFICANT CHANGE UP (ref 0–0)
PLATELET # BLD AUTO: 168 K/UL — SIGNIFICANT CHANGE UP (ref 130–400)
PMV BLD: 10.6 FL — HIGH (ref 7.4–10.4)
POTASSIUM SERPL-MCNC: 4.2 MMOL/L — SIGNIFICANT CHANGE UP (ref 3.5–5)
POTASSIUM SERPL-SCNC: 4.2 MMOL/L — SIGNIFICANT CHANGE UP (ref 3.5–5)
RBC # BLD: 3.52 M/UL — LOW (ref 4.2–5.4)
RBC # FLD: 13.2 % — SIGNIFICANT CHANGE UP (ref 11.5–14.5)
SODIUM SERPL-SCNC: 139 MMOL/L — SIGNIFICANT CHANGE UP (ref 135–146)
WBC # BLD: 12.05 K/UL — HIGH (ref 4.8–10.8)
WBC # FLD AUTO: 12.05 K/UL — HIGH (ref 4.8–10.8)

## 2024-06-23 RX ADMIN — Medication 400 MILLIGRAM(S): at 17:00

## 2024-06-23 RX ADMIN — MORPHINE SULFATE 2 MILLIGRAM(S): 100 TABLET, EXTENDED RELEASE ORAL at 11:56

## 2024-06-23 RX ADMIN — Medication 650 MILLIGRAM(S): at 21:00

## 2024-06-23 RX ADMIN — Medication 400 MILLIGRAM(S): at 17:30

## 2024-06-23 RX ADMIN — Medication 650 MILLIGRAM(S): at 20:15

## 2024-06-23 RX ADMIN — MORPHINE SULFATE 2 MILLIGRAM(S): 100 TABLET, EXTENDED RELEASE ORAL at 17:51

## 2024-06-23 RX ADMIN — PANTOPRAZOLE SODIUM 40 MILLIGRAM(S): 40 INJECTION, POWDER, FOR SOLUTION INTRAVENOUS at 06:18

## 2024-06-23 RX ADMIN — Medication 2 TABLET(S): at 22:11

## 2024-06-23 RX ADMIN — MODAFINIL 150 MILLIGRAM(S): 100 TABLET ORAL at 11:30

## 2024-06-23 RX ADMIN — ATORVASTATIN CALCIUM 40 MILLIGRAM(S): 20 TABLET, FILM COATED ORAL at 22:11

## 2024-06-23 RX ADMIN — SERTRALINE HYDROCHLORIDE 100 MILLIGRAM(S): 100 TABLET, FILM COATED ORAL at 11:21

## 2024-06-23 RX ADMIN — POLYETHYLENE GLYCOL 3350 17 GRAM(S): 1 POWDER ORAL at 22:13

## 2024-06-23 RX ADMIN — MORPHINE SULFATE 2 MILLIGRAM(S): 100 TABLET, EXTENDED RELEASE ORAL at 06:50

## 2024-06-23 RX ADMIN — Medication 400 MILLIGRAM(S): at 06:17

## 2024-06-23 RX ADMIN — Medication 3 MILLIGRAM(S): at 22:12

## 2024-06-23 RX ADMIN — MORPHINE SULFATE 2 MILLIGRAM(S): 100 TABLET, EXTENDED RELEASE ORAL at 18:10

## 2024-06-23 RX ADMIN — MORPHINE SULFATE 2 MILLIGRAM(S): 100 TABLET, EXTENDED RELEASE ORAL at 11:21

## 2024-06-23 RX ADMIN — Medication 650 MILLIGRAM(S): at 08:16

## 2024-06-23 RX ADMIN — Medication 400 MILLIGRAM(S): at 07:00

## 2024-06-23 RX ADMIN — ENOXAPARIN SODIUM 40 MILLIGRAM(S): 100 INJECTION SUBCUTANEOUS at 06:18

## 2024-06-23 RX ADMIN — MORPHINE SULFATE 2 MILLIGRAM(S): 100 TABLET, EXTENDED RELEASE ORAL at 06:15

## 2024-06-24 LAB
ANION GAP SERPL CALC-SCNC: 12 MMOL/L — SIGNIFICANT CHANGE UP (ref 7–14)
ANION GAP SERPL CALC-SCNC: 13 MMOL/L — SIGNIFICANT CHANGE UP (ref 7–14)
BUN SERPL-MCNC: 12 MG/DL — SIGNIFICANT CHANGE UP (ref 10–20)
BUN SERPL-MCNC: 15 MG/DL — SIGNIFICANT CHANGE UP (ref 10–20)
CALCIUM SERPL-MCNC: 9 MG/DL — SIGNIFICANT CHANGE UP (ref 8.4–10.4)
CALCIUM SERPL-MCNC: 9 MG/DL — SIGNIFICANT CHANGE UP (ref 8.4–10.5)
CHLORIDE SERPL-SCNC: 100 MMOL/L — SIGNIFICANT CHANGE UP (ref 98–110)
CHLORIDE SERPL-SCNC: 101 MMOL/L — SIGNIFICANT CHANGE UP (ref 98–110)
CO2 SERPL-SCNC: 27 MMOL/L — SIGNIFICANT CHANGE UP (ref 17–32)
CO2 SERPL-SCNC: 27 MMOL/L — SIGNIFICANT CHANGE UP (ref 17–32)
CREAT SERPL-MCNC: 0.6 MG/DL — LOW (ref 0.7–1.5)
CREAT SERPL-MCNC: 0.7 MG/DL — SIGNIFICANT CHANGE UP (ref 0.7–1.5)
EGFR: 103 ML/MIN/1.73M2 — SIGNIFICANT CHANGE UP
EGFR: 99 ML/MIN/1.73M2 — SIGNIFICANT CHANGE UP
GLUCOSE SERPL-MCNC: 106 MG/DL — HIGH (ref 70–99)
GLUCOSE SERPL-MCNC: 99 MG/DL — SIGNIFICANT CHANGE UP (ref 70–99)
HCT VFR BLD CALC: 31.5 % — LOW (ref 37–47)
HGB BLD-MCNC: 10.7 G/DL — LOW (ref 12–16)
MCHC RBC-ENTMCNC: 31.5 PG — HIGH (ref 27–31)
MCHC RBC-ENTMCNC: 34 G/DL — SIGNIFICANT CHANGE UP (ref 32–37)
MCV RBC AUTO: 92.6 FL — SIGNIFICANT CHANGE UP (ref 81–99)
NRBC # BLD: 0 /100 WBCS — SIGNIFICANT CHANGE UP (ref 0–0)
PLATELET # BLD AUTO: 167 K/UL — SIGNIFICANT CHANGE UP (ref 130–400)
PMV BLD: 10.6 FL — HIGH (ref 7.4–10.4)
POTASSIUM SERPL-MCNC: 4 MMOL/L — SIGNIFICANT CHANGE UP (ref 3.5–5)
POTASSIUM SERPL-MCNC: 4.1 MMOL/L — SIGNIFICANT CHANGE UP (ref 3.5–5)
POTASSIUM SERPL-SCNC: 4 MMOL/L — SIGNIFICANT CHANGE UP (ref 3.5–5)
POTASSIUM SERPL-SCNC: 4.1 MMOL/L — SIGNIFICANT CHANGE UP (ref 3.5–5)
RBC # BLD: 3.4 M/UL — LOW (ref 4.2–5.4)
RBC # FLD: 13 % — SIGNIFICANT CHANGE UP (ref 11.5–14.5)
SODIUM SERPL-SCNC: 140 MMOL/L — SIGNIFICANT CHANGE UP (ref 135–146)
SODIUM SERPL-SCNC: 140 MMOL/L — SIGNIFICANT CHANGE UP (ref 135–146)
WBC # BLD: 11.96 K/UL — HIGH (ref 4.8–10.8)
WBC # FLD AUTO: 11.96 K/UL — HIGH (ref 4.8–10.8)

## 2024-06-24 PROCEDURE — 90792 PSYCH DIAG EVAL W/MED SRVCS: CPT | Mod: 95

## 2024-06-24 RX ORDER — MORPHINE SULFATE 100 MG/1
2 TABLET, EXTENDED RELEASE ORAL EVERY 6 HOURS
Refills: 0 | Status: DISCONTINUED | OUTPATIENT
Start: 2024-06-24 | End: 2024-06-26

## 2024-06-24 RX ADMIN — MORPHINE SULFATE 2 MILLIGRAM(S): 100 TABLET, EXTENDED RELEASE ORAL at 00:25

## 2024-06-24 RX ADMIN — Medication 650 MILLIGRAM(S): at 05:10

## 2024-06-24 RX ADMIN — MORPHINE SULFATE 2 MILLIGRAM(S): 100 TABLET, EXTENDED RELEASE ORAL at 03:33

## 2024-06-24 RX ADMIN — Medication 650 MILLIGRAM(S): at 03:37

## 2024-06-24 RX ADMIN — MORPHINE SULFATE 2 MILLIGRAM(S): 100 TABLET, EXTENDED RELEASE ORAL at 18:27

## 2024-06-24 RX ADMIN — ENOXAPARIN SODIUM 40 MILLIGRAM(S): 100 INJECTION SUBCUTANEOUS at 05:08

## 2024-06-24 RX ADMIN — MODAFINIL 150 MILLIGRAM(S): 100 TABLET ORAL at 12:14

## 2024-06-24 RX ADMIN — Medication 650 MILLIGRAM(S): at 13:30

## 2024-06-24 RX ADMIN — ATORVASTATIN CALCIUM 40 MILLIGRAM(S): 20 TABLET, FILM COATED ORAL at 22:16

## 2024-06-24 RX ADMIN — Medication 400 MILLIGRAM(S): at 05:07

## 2024-06-24 RX ADMIN — Medication 650 MILLIGRAM(S): at 11:49

## 2024-06-24 RX ADMIN — PANTOPRAZOLE SODIUM 40 MILLIGRAM(S): 40 INJECTION, POWDER, FOR SOLUTION INTRAVENOUS at 05:08

## 2024-06-24 RX ADMIN — Medication 650 MILLIGRAM(S): at 18:37

## 2024-06-24 RX ADMIN — MORPHINE SULFATE 2 MILLIGRAM(S): 100 TABLET, EXTENDED RELEASE ORAL at 17:49

## 2024-06-24 RX ADMIN — SERTRALINE HYDROCHLORIDE 100 MILLIGRAM(S): 100 TABLET, FILM COATED ORAL at 11:50

## 2024-06-24 RX ADMIN — MORPHINE SULFATE 2 MILLIGRAM(S): 100 TABLET, EXTENDED RELEASE ORAL at 08:38

## 2024-06-24 RX ADMIN — MORPHINE SULFATE 2 MILLIGRAM(S): 100 TABLET, EXTENDED RELEASE ORAL at 09:08

## 2024-06-24 RX ADMIN — Medication 400 MILLIGRAM(S): at 06:25

## 2024-06-25 LAB
HCT VFR BLD CALC: 32.7 % — LOW (ref 37–47)
HGB BLD-MCNC: 11.1 G/DL — LOW (ref 12–16)
MAGNESIUM SERPL-MCNC: 2 MG/DL — SIGNIFICANT CHANGE UP (ref 1.8–2.4)
MCHC RBC-ENTMCNC: 31.8 PG — HIGH (ref 27–31)
MCHC RBC-ENTMCNC: 33.9 G/DL — SIGNIFICANT CHANGE UP (ref 32–37)
MCV RBC AUTO: 93.7 FL — SIGNIFICANT CHANGE UP (ref 81–99)
NRBC # BLD: 0 /100 WBCS — SIGNIFICANT CHANGE UP (ref 0–0)
PLATELET # BLD AUTO: 202 K/UL — SIGNIFICANT CHANGE UP (ref 130–400)
PMV BLD: 10.4 FL — SIGNIFICANT CHANGE UP (ref 7.4–10.4)
RBC # BLD: 3.49 M/UL — LOW (ref 4.2–5.4)
RBC # FLD: 13 % — SIGNIFICANT CHANGE UP (ref 11.5–14.5)
TSH SERPL-MCNC: 0.58 UIU/ML — SIGNIFICANT CHANGE UP (ref 0.27–4.2)
WBC # BLD: 12.11 K/UL — HIGH (ref 4.8–10.8)
WBC # FLD AUTO: 12.11 K/UL — HIGH (ref 4.8–10.8)

## 2024-06-25 RX ORDER — KETOROLAC TROMETHAMINE 30 MG/ML
15 INJECTION, SOLUTION INTRAMUSCULAR EVERY 12 HOURS
Refills: 0 | Status: DISCONTINUED | OUTPATIENT
Start: 2024-06-25 | End: 2024-06-26

## 2024-06-25 RX ADMIN — Medication 650 MILLIGRAM(S): at 00:26

## 2024-06-25 RX ADMIN — MORPHINE SULFATE 2 MILLIGRAM(S): 100 TABLET, EXTENDED RELEASE ORAL at 00:00

## 2024-06-25 RX ADMIN — Medication 650 MILLIGRAM(S): at 20:15

## 2024-06-25 RX ADMIN — MORPHINE SULFATE 2 MILLIGRAM(S): 100 TABLET, EXTENDED RELEASE ORAL at 20:30

## 2024-06-25 RX ADMIN — MORPHINE SULFATE 2 MILLIGRAM(S): 100 TABLET, EXTENDED RELEASE ORAL at 14:00

## 2024-06-25 RX ADMIN — SERTRALINE HYDROCHLORIDE 100 MILLIGRAM(S): 100 TABLET, FILM COATED ORAL at 12:14

## 2024-06-25 RX ADMIN — Medication 650 MILLIGRAM(S): at 21:15

## 2024-06-25 RX ADMIN — MORPHINE SULFATE 2 MILLIGRAM(S): 100 TABLET, EXTENDED RELEASE ORAL at 20:16

## 2024-06-25 RX ADMIN — ATORVASTATIN CALCIUM 40 MILLIGRAM(S): 20 TABLET, FILM COATED ORAL at 21:04

## 2024-06-25 RX ADMIN — Medication 3 MILLIGRAM(S): at 21:04

## 2024-06-25 RX ADMIN — KETOROLAC TROMETHAMINE 15 MILLIGRAM(S): 30 INJECTION, SOLUTION INTRAMUSCULAR at 12:11

## 2024-06-25 RX ADMIN — Medication 650 MILLIGRAM(S): at 06:02

## 2024-06-25 RX ADMIN — MODAFINIL 150 MILLIGRAM(S): 100 TABLET ORAL at 12:14

## 2024-06-25 RX ADMIN — ENOXAPARIN SODIUM 40 MILLIGRAM(S): 100 INJECTION SUBCUTANEOUS at 06:02

## 2024-06-25 RX ADMIN — MORPHINE SULFATE 2 MILLIGRAM(S): 100 TABLET, EXTENDED RELEASE ORAL at 13:46

## 2024-06-25 RX ADMIN — KETOROLAC TROMETHAMINE 15 MILLIGRAM(S): 30 INJECTION, SOLUTION INTRAMUSCULAR at 12:30

## 2024-06-25 RX ADMIN — PANTOPRAZOLE SODIUM 40 MILLIGRAM(S): 40 INJECTION, POWDER, FOR SOLUTION INTRAVENOUS at 06:04

## 2024-06-25 RX ADMIN — MORPHINE SULFATE 2 MILLIGRAM(S): 100 TABLET, EXTENDED RELEASE ORAL at 06:02

## 2024-06-26 DIAGNOSIS — E78.5 HYPERLIPIDEMIA, UNSPECIFIED: ICD-10-CM

## 2024-06-26 DIAGNOSIS — G35 MULTIPLE SCLEROSIS: ICD-10-CM

## 2024-06-26 DIAGNOSIS — F32.A DEPRESSION, UNSPECIFIED: ICD-10-CM

## 2024-06-26 DIAGNOSIS — G43.909 MIGRAINE, UNSPECIFIED, NOT INTRACTABLE, WITHOUT STATUS MIGRAINOSUS: ICD-10-CM

## 2024-06-26 LAB
ANION GAP SERPL CALC-SCNC: 10 MMOL/L — SIGNIFICANT CHANGE UP (ref 7–14)
BLD GP AB SCN SERPL QL: SIGNIFICANT CHANGE UP
BUN SERPL-MCNC: 20 MG/DL — SIGNIFICANT CHANGE UP (ref 10–20)
CALCIUM SERPL-MCNC: 9 MG/DL — SIGNIFICANT CHANGE UP (ref 8.4–10.5)
CHLORIDE SERPL-SCNC: 103 MMOL/L — SIGNIFICANT CHANGE UP (ref 98–110)
CO2 SERPL-SCNC: 30 MMOL/L — SIGNIFICANT CHANGE UP (ref 17–32)
CREAT SERPL-MCNC: 0.7 MG/DL — SIGNIFICANT CHANGE UP (ref 0.7–1.5)
EGFR: 99 ML/MIN/1.73M2 — SIGNIFICANT CHANGE UP
FOLATE SERPL-MCNC: 17.4 NG/ML — SIGNIFICANT CHANGE UP
GLUCOSE SERPL-MCNC: 94 MG/DL — SIGNIFICANT CHANGE UP (ref 70–99)
HCT VFR BLD CALC: 33.3 % — LOW (ref 37–47)
HGB BLD-MCNC: 11 G/DL — LOW (ref 12–16)
MCHC RBC-ENTMCNC: 31.3 PG — HIGH (ref 27–31)
MCHC RBC-ENTMCNC: 33 G/DL — SIGNIFICANT CHANGE UP (ref 32–37)
MCV RBC AUTO: 94.6 FL — SIGNIFICANT CHANGE UP (ref 81–99)
NRBC # BLD: 0 /100 WBCS — SIGNIFICANT CHANGE UP (ref 0–0)
PLATELET # BLD AUTO: 214 K/UL — SIGNIFICANT CHANGE UP (ref 130–400)
PMV BLD: 9.8 FL — SIGNIFICANT CHANGE UP (ref 7.4–10.4)
POTASSIUM SERPL-MCNC: 4.5 MMOL/L — SIGNIFICANT CHANGE UP (ref 3.5–5)
POTASSIUM SERPL-SCNC: 4.5 MMOL/L — SIGNIFICANT CHANGE UP (ref 3.5–5)
RBC # BLD: 3.52 M/UL — LOW (ref 4.2–5.4)
RBC # FLD: 12.8 % — SIGNIFICANT CHANGE UP (ref 11.5–14.5)
SODIUM SERPL-SCNC: 143 MMOL/L — SIGNIFICANT CHANGE UP (ref 135–146)
VIT B12 SERPL-MCNC: 547 PG/ML — SIGNIFICANT CHANGE UP (ref 232–1245)
WBC # BLD: 8.88 K/UL — SIGNIFICANT CHANGE UP (ref 4.8–10.8)
WBC # FLD AUTO: 8.88 K/UL — SIGNIFICANT CHANGE UP (ref 4.8–10.8)

## 2024-06-26 PROCEDURE — 99232 SBSQ HOSP IP/OBS MODERATE 35: CPT

## 2024-06-26 RX ORDER — GABAPENTIN
300 POWDER (GRAM) MISCELLANEOUS THREE TIMES A DAY
Refills: 0 | Status: DISCONTINUED | OUTPATIENT
Start: 2024-06-26 | End: 2024-06-27

## 2024-06-26 RX ORDER — BACLOFEN 20 MG
10 TABLET ORAL EVERY 12 HOURS
Refills: 0 | Status: DISCONTINUED | OUTPATIENT
Start: 2024-06-26 | End: 2024-06-27

## 2024-06-26 RX ORDER — ARMODAFINIL 250 MG/1
150 TABLET ORAL DAILY
Refills: 0 | Status: DISCONTINUED | OUTPATIENT
Start: 2024-06-26 | End: 2024-06-27

## 2024-06-26 RX ADMIN — ARMODAFINIL 150 MILLIGRAM(S): 250 TABLET ORAL at 11:31

## 2024-06-26 RX ADMIN — MORPHINE SULFATE 2 MILLIGRAM(S): 100 TABLET, EXTENDED RELEASE ORAL at 09:00

## 2024-06-26 RX ADMIN — Medication 300 MILLIGRAM(S): at 13:28

## 2024-06-26 RX ADMIN — Medication 650 MILLIGRAM(S): at 22:07

## 2024-06-26 RX ADMIN — Medication 10 MILLIGRAM(S): at 17:29

## 2024-06-26 RX ADMIN — PANTOPRAZOLE SODIUM 40 MILLIGRAM(S): 40 INJECTION, POWDER, FOR SOLUTION INTRAVENOUS at 05:16

## 2024-06-26 RX ADMIN — MORPHINE SULFATE 2 MILLIGRAM(S): 100 TABLET, EXTENDED RELEASE ORAL at 08:48

## 2024-06-26 RX ADMIN — ATORVASTATIN CALCIUM 40 MILLIGRAM(S): 20 TABLET, FILM COATED ORAL at 22:11

## 2024-06-26 RX ADMIN — Medication 1 APPLICATION(S): at 11:32

## 2024-06-26 RX ADMIN — Medication 300 MILLIGRAM(S): at 22:11

## 2024-06-26 RX ADMIN — KETOROLAC TROMETHAMINE 15 MILLIGRAM(S): 30 INJECTION, SOLUTION INTRAMUSCULAR at 05:16

## 2024-06-26 RX ADMIN — Medication 650 MILLIGRAM(S): at 20:24

## 2024-06-26 RX ADMIN — ENOXAPARIN SODIUM 40 MILLIGRAM(S): 100 INJECTION SUBCUTANEOUS at 06:33

## 2024-06-26 RX ADMIN — MORPHINE SULFATE 2 MILLIGRAM(S): 100 TABLET, EXTENDED RELEASE ORAL at 16:00

## 2024-06-26 RX ADMIN — MORPHINE SULFATE 2 MILLIGRAM(S): 100 TABLET, EXTENDED RELEASE ORAL at 15:42

## 2024-06-27 ENCOUNTER — TRANSCRIPTION ENCOUNTER (OUTPATIENT)
Age: 59
End: 2024-06-27

## 2024-06-27 VITALS
OXYGEN SATURATION: 98 % | HEART RATE: 96 BPM | RESPIRATION RATE: 18 BRPM | DIASTOLIC BLOOD PRESSURE: 65 MMHG | SYSTOLIC BLOOD PRESSURE: 115 MMHG | TEMPERATURE: 98 F

## 2024-06-27 LAB
HCT VFR BLD CALC: 29.8 % — LOW (ref 37–47)
HGB BLD-MCNC: 10.1 G/DL — LOW (ref 12–16)
MCHC RBC-ENTMCNC: 31.5 PG — HIGH (ref 27–31)
MCHC RBC-ENTMCNC: 33.9 G/DL — SIGNIFICANT CHANGE UP (ref 32–37)
MCV RBC AUTO: 92.8 FL — SIGNIFICANT CHANGE UP (ref 81–99)
NRBC # BLD: 0 /100 WBCS — SIGNIFICANT CHANGE UP (ref 0–0)
PLATELET # BLD AUTO: 239 K/UL — SIGNIFICANT CHANGE UP (ref 130–400)
PMV BLD: 9.8 FL — SIGNIFICANT CHANGE UP (ref 7.4–10.4)
RBC # BLD: 3.21 M/UL — LOW (ref 4.2–5.4)
RBC # FLD: 12.5 % — SIGNIFICANT CHANGE UP (ref 11.5–14.5)
WBC # BLD: 9.57 K/UL — SIGNIFICANT CHANGE UP (ref 4.8–10.8)
WBC # FLD AUTO: 9.57 K/UL — SIGNIFICANT CHANGE UP (ref 4.8–10.8)

## 2024-06-27 PROCEDURE — 99238 HOSP IP/OBS DSCHRG MGMT 30/<: CPT

## 2024-06-27 RX ORDER — ASPIRIN 325 MG/1
1 TABLET, FILM COATED ORAL
Qty: 84 | Refills: 0
Start: 2024-06-27 | End: 2024-08-07

## 2024-06-27 RX ORDER — ACETAMINOPHEN 325 MG
2 TABLET ORAL
Qty: 0 | Refills: 0 | DISCHARGE
Start: 2024-06-27

## 2024-06-27 RX ORDER — GABAPENTIN
1 POWDER (GRAM) MISCELLANEOUS
Qty: 0 | Refills: 0 | DISCHARGE
Start: 2024-06-27

## 2024-06-27 RX ORDER — PANTOPRAZOLE SODIUM 40 MG/10ML
1 INJECTION, POWDER, FOR SOLUTION INTRAVENOUS
Qty: 42 | Refills: 0
Start: 2024-06-27 | End: 2024-08-07

## 2024-06-27 RX ORDER — BACLOFEN 20 MG
1 TABLET ORAL
Qty: 0 | Refills: 0 | DISCHARGE
Start: 2024-06-27

## 2024-06-27 RX ORDER — ARMODAFINIL 250 MG/1
1 TABLET ORAL
Qty: 0 | Refills: 0 | DISCHARGE
Start: 2024-06-27

## 2024-06-27 RX ORDER — MODAFINIL 100 MG/1
1.5 TABLET ORAL
Refills: 0 | DISCHARGE

## 2024-06-27 RX ADMIN — PANTOPRAZOLE SODIUM 40 MILLIGRAM(S): 40 INJECTION, POWDER, FOR SOLUTION INTRAVENOUS at 06:00

## 2024-06-27 RX ADMIN — Medication 650 MILLIGRAM(S): at 05:51

## 2024-06-27 RX ADMIN — Medication 300 MILLIGRAM(S): at 13:34

## 2024-06-27 RX ADMIN — ARMODAFINIL 150 MILLIGRAM(S): 250 TABLET ORAL at 11:18

## 2024-06-27 RX ADMIN — ENOXAPARIN SODIUM 40 MILLIGRAM(S): 100 INJECTION SUBCUTANEOUS at 05:57

## 2024-06-27 RX ADMIN — Medication 1 APPLICATION(S): at 05:56

## 2024-06-27 RX ADMIN — Medication 650 MILLIGRAM(S): at 04:42

## 2024-06-27 RX ADMIN — SERTRALINE HYDROCHLORIDE 100 MILLIGRAM(S): 100 TABLET, FILM COATED ORAL at 13:34

## 2024-06-27 RX ADMIN — Medication 10 MILLIGRAM(S): at 17:36

## 2024-06-27 RX ADMIN — Medication 10 MILLIGRAM(S): at 05:55

## 2024-06-27 RX ADMIN — Medication 300 MILLIGRAM(S): at 05:55

## 2024-07-09 ENCOUNTER — APPOINTMENT (OUTPATIENT)
Dept: ORTHOPEDIC SURGERY | Facility: ASSISTED LIVING FACILITY | Age: 59
End: 2024-07-09

## 2024-07-09 DIAGNOSIS — S72.001A FRACTURE OF UNSPECIFIED PART OF NECK OF RIGHT FEMUR, INITIAL ENCOUNTER FOR CLOSED FRACTURE: ICD-10-CM

## 2024-07-09 PROCEDURE — 99024 POSTOP FOLLOW-UP VISIT: CPT

## 2024-07-10 ENCOUNTER — APPOINTMENT (OUTPATIENT)
Dept: NEUROLOGY | Facility: CLINIC | Age: 59
End: 2024-07-10
Payer: MEDICAID

## 2024-07-10 VITALS
WEIGHT: 152 LBS | DIASTOLIC BLOOD PRESSURE: 66 MMHG | BODY MASS INDEX: 22.51 KG/M2 | SYSTOLIC BLOOD PRESSURE: 103 MMHG | HEIGHT: 69 IN | HEART RATE: 78 BPM

## 2024-07-10 DIAGNOSIS — G35 MULTIPLE SCLEROSIS: ICD-10-CM

## 2024-07-10 DIAGNOSIS — F41.9 ANXIETY DISORDER, UNSPECIFIED: ICD-10-CM

## 2024-07-10 PROCEDURE — 99215 OFFICE O/P EST HI 40 MIN: CPT

## 2024-07-10 PROCEDURE — G2211 COMPLEX E/M VISIT ADD ON: CPT | Mod: NC

## 2024-07-10 RX ORDER — GLATIRAMER 40 MG/ML
40 INJECTION, SOLUTION SUBCUTANEOUS
Qty: 45 | Refills: 3 | Status: ACTIVE | COMMUNITY
Start: 2024-07-10 | End: 1900-01-01

## 2024-07-11 DIAGNOSIS — E78.5 HYPERLIPIDEMIA, UNSPECIFIED: ICD-10-CM

## 2024-07-11 DIAGNOSIS — R41.0 DISORIENTATION, UNSPECIFIED: ICD-10-CM

## 2024-07-11 DIAGNOSIS — R45.1 RESTLESSNESS AND AGITATION: ICD-10-CM

## 2024-07-11 DIAGNOSIS — S72.091A OTHER FRACTURE OF HEAD AND NECK OF RIGHT FEMUR, INITIAL ENCOUNTER FOR CLOSED FRACTURE: ICD-10-CM

## 2024-07-11 DIAGNOSIS — E87.5 HYPERKALEMIA: ICD-10-CM
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
## 2024-07-11 DIAGNOSIS — F32.A DEPRESSION, UNSPECIFIED: ICD-10-CM

## 2024-07-11 DIAGNOSIS — G35 MULTIPLE SCLEROSIS: ICD-10-CM

## 2024-07-11 DIAGNOSIS — G47.419 NARCOLEPSY WITHOUT CATAPLEXY: ICD-10-CM

## 2024-07-12 ENCOUNTER — OUTPATIENT (OUTPATIENT)
Dept: OUTPATIENT SERVICES | Facility: HOSPITAL | Age: 59
LOS: 1 days | End: 2024-07-12

## 2024-07-12 ENCOUNTER — APPOINTMENT (OUTPATIENT)
Dept: INTERNAL MEDICINE | Facility: CLINIC | Age: 59
End: 2024-07-12

## 2024-07-12 ENCOUNTER — TRANSCRIPTION ENCOUNTER (OUTPATIENT)
Age: 59
End: 2024-07-12

## 2024-07-15 ENCOUNTER — NON-APPOINTMENT (OUTPATIENT)
Age: 59
End: 2024-07-15

## 2024-07-15 DIAGNOSIS — G35 MULTIPLE SCLEROSIS: ICD-10-CM

## 2024-07-16 DIAGNOSIS — F32.A ANXIETY DISORDER, UNSPECIFIED: ICD-10-CM

## 2024-07-16 DIAGNOSIS — F41.9 ANXIETY DISORDER, UNSPECIFIED: ICD-10-CM

## 2024-07-16 RX ORDER — GLATIRAMER ACETATE 20 MG/ML
INJECTION, SOLUTION SUBCUTANEOUS
Qty: 1 | Refills: 3 | Status: ACTIVE | COMMUNITY
Start: 2024-07-15 | End: 1900-01-01

## 2024-07-19 RX ORDER — ESCITALOPRAM OXALATE 5 MG/1
5 TABLET ORAL
Qty: 30 | Refills: 5 | Status: ACTIVE | COMMUNITY
Start: 2024-07-16 | End: 1900-01-01

## 2024-12-11 ENCOUNTER — RX RENEWAL (OUTPATIENT)
Age: 59
End: 2024-12-11

## 2024-12-24 PROBLEM — F10.90 ALCOHOL USE: Status: ACTIVE | Noted: 2017-12-28

## 2025-01-09 ENCOUNTER — OUTPATIENT (OUTPATIENT)
Dept: OUTPATIENT SERVICES | Facility: HOSPITAL | Age: 60
LOS: 1 days | End: 2025-01-09

## 2025-01-09 ENCOUNTER — TRANSCRIPTION ENCOUNTER (OUTPATIENT)
Age: 60
End: 2025-01-09

## 2025-01-15 ENCOUNTER — TRANSCRIPTION ENCOUNTER (OUTPATIENT)
Age: 60
End: 2025-01-15

## 2025-01-27 DIAGNOSIS — G35 MULTIPLE SCLEROSIS: ICD-10-CM

## 2025-07-15 RX ORDER — FLUOXETINE HYDROCHLORIDE 20 MG/1
20 CAPSULE ORAL
Qty: 90 | Refills: 0 | Status: ACTIVE | COMMUNITY
Start: 2025-07-14 | End: 1900-01-01